# Patient Record
Sex: MALE | Race: WHITE | Employment: FULL TIME | URBAN - METROPOLITAN AREA
[De-identification: names, ages, dates, MRNs, and addresses within clinical notes are randomized per-mention and may not be internally consistent; named-entity substitution may affect disease eponyms.]

---

## 2018-02-01 ENCOUNTER — TELEPHONE (OUTPATIENT)
Dept: FAMILY MEDICINE CLINIC | Facility: CLINIC | Age: 58
End: 2018-02-01

## 2018-02-01 DIAGNOSIS — Z00.00 LABORATORY EXAMINATION ORDERED AS PART OF A ROUTINE GENERAL MEDICAL EXAMINATION: Primary | ICD-10-CM

## 2018-02-20 ENCOUNTER — LAB ENCOUNTER (OUTPATIENT)
Dept: LAB | Age: 58
End: 2018-02-20
Attending: FAMILY MEDICINE
Payer: COMMERCIAL

## 2018-02-20 DIAGNOSIS — Z00.00 LABORATORY EXAMINATION ORDERED AS PART OF A ROUTINE GENERAL MEDICAL EXAMINATION: ICD-10-CM

## 2018-02-20 LAB
ALBUMIN SERPL-MCNC: 3.9 G/DL (ref 3.5–4.8)
ALP LIVER SERPL-CCNC: 94 U/L (ref 45–117)
ALT SERPL-CCNC: 29 U/L (ref 17–63)
AST SERPL-CCNC: 20 U/L (ref 15–41)
BASOPHILS # BLD AUTO: 0.02 X10(3) UL (ref 0–0.1)
BASOPHILS NFR BLD AUTO: 0.5 %
BILIRUB SERPL-MCNC: 2 MG/DL (ref 0.1–2)
BUN BLD-MCNC: 13 MG/DL (ref 8–20)
CALCIUM BLD-MCNC: 9 MG/DL (ref 8.3–10.3)
CHLORIDE: 104 MMOL/L (ref 101–111)
CHOLEST SMN-MCNC: 186 MG/DL (ref ?–200)
CO2: 27 MMOL/L (ref 22–32)
COMPLEXED PSA SERPL-MCNC: 0.37 NG/ML (ref 0.01–4)
CREAT BLD-MCNC: 0.91 MG/DL (ref 0.7–1.3)
EOSINOPHIL # BLD AUTO: 0.05 X10(3) UL (ref 0–0.3)
EOSINOPHIL NFR BLD AUTO: 1.3 %
ERYTHROCYTE [DISTWIDTH] IN BLOOD BY AUTOMATED COUNT: 13.3 % (ref 11.5–16)
GLUCOSE BLD-MCNC: 72 MG/DL (ref 70–99)
HCT VFR BLD AUTO: 41.2 % (ref 37–53)
HDLC SERPL-MCNC: 47 MG/DL (ref 45–?)
HDLC SERPL: 3.96 {RATIO} (ref ?–4.97)
HEPATITIS C VIRUS AB INTERPRETATION: NONREACTIVE
HGB BLD-MCNC: 13.8 G/DL (ref 13–17)
IMMATURE GRANULOCYTE COUNT: 0.01 X10(3) UL (ref 0–1)
IMMATURE GRANULOCYTE RATIO %: 0.3 %
LDLC SERPL CALC-MCNC: 123 MG/DL (ref ?–130)
LYMPHOCYTES # BLD AUTO: 1.6 X10(3) UL (ref 0.9–4)
LYMPHOCYTES NFR BLD AUTO: 40.3 %
M PROTEIN MFR SERPL ELPH: 6.8 G/DL (ref 6.1–8.3)
MCH RBC QN AUTO: 29.5 PG (ref 27–33.2)
MCHC RBC AUTO-ENTMCNC: 33.5 G/DL (ref 31–37)
MCV RBC AUTO: 88 FL (ref 80–99)
MONOCYTES # BLD AUTO: 0.45 X10(3) UL (ref 0.1–1)
MONOCYTES NFR BLD AUTO: 11.3 %
NEUTROPHIL ABS PRELIM: 1.84 X10 (3) UL (ref 1.3–6.7)
NEUTROPHILS # BLD AUTO: 1.84 X10(3) UL (ref 1.3–6.7)
NEUTROPHILS NFR BLD AUTO: 46.3 %
NONHDLC SERPL-MCNC: 139 MG/DL (ref ?–130)
PLATELET # BLD AUTO: 256 10(3)UL (ref 150–450)
POTASSIUM SERPL-SCNC: 3.6 MMOL/L (ref 3.6–5.1)
RBC # BLD AUTO: 4.68 X10(6)UL (ref 4.3–5.7)
RED CELL DISTRIBUTION WIDTH-SD: 42.8 FL (ref 35.1–46.3)
SODIUM SERPL-SCNC: 137 MMOL/L (ref 136–144)
TRIGL SERPL-MCNC: 82 MG/DL (ref ?–150)
TSI SER-ACNC: 2.45 MIU/ML (ref 0.35–5.5)
VLDLC SERPL CALC-MCNC: 16 MG/DL (ref 5–40)
WBC # BLD AUTO: 4 X10(3) UL (ref 4–13)

## 2018-02-20 PROCEDURE — 84443 ASSAY THYROID STIM HORMONE: CPT

## 2018-02-20 PROCEDURE — 80061 LIPID PANEL: CPT

## 2018-02-20 PROCEDURE — 85025 COMPLETE CBC W/AUTO DIFF WBC: CPT

## 2018-02-20 PROCEDURE — 86803 HEPATITIS C AB TEST: CPT

## 2018-02-20 PROCEDURE — 80053 COMPREHEN METABOLIC PANEL: CPT

## 2018-03-01 ENCOUNTER — TELEPHONE (OUTPATIENT)
Dept: FAMILY MEDICINE CLINIC | Facility: CLINIC | Age: 58
End: 2018-03-01

## 2018-03-01 ENCOUNTER — OFFICE VISIT (OUTPATIENT)
Dept: FAMILY MEDICINE CLINIC | Facility: CLINIC | Age: 58
End: 2018-03-01

## 2018-03-01 VITALS
SYSTOLIC BLOOD PRESSURE: 120 MMHG | TEMPERATURE: 97 F | WEIGHT: 158 LBS | DIASTOLIC BLOOD PRESSURE: 80 MMHG | BODY MASS INDEX: 24.51 KG/M2 | HEART RATE: 72 BPM | RESPIRATION RATE: 14 BRPM | HEIGHT: 67.25 IN

## 2018-03-01 DIAGNOSIS — Z00.00 ANNUAL PHYSICAL EXAM: Primary | ICD-10-CM

## 2018-03-01 PROCEDURE — 99396 PREV VISIT EST AGE 40-64: CPT | Performed by: FAMILY MEDICINE

## 2018-03-01 NOTE — PROGRESS NOTES
Bernice Lopez is a 56year old male who presents for a complete physical exam.   HPI:   Patient presents with:  Physical      His last annual assessment has been over 1 year: Annual Physical due on 12/18/2016       Pt complains of doing well, much b 02/20/2018 08:49 AM       No results found for: A1C, VITD, PSA           Current Outpatient Prescriptions on File Prior to Visit:  Cholecalciferol (VITAMIN D3) 25860 UNITS Oral Cap Take 1 tablet by mouth every 7 days.      No current facility-administered m Nursing note and vitals reviewed. Constitutional: He is oriented to person, place, and time. He appears well-developed and well-nourished. No distress. HENT:   Head: Normocephalic and atraumatic.    Right Ear: Hearing, tympanic membrane, external ear exercise 30 minutes three times weekly.    Health maintenance, UTD   Immunizations: UTD    Immunization History   Administered Date(s) Administered   • >=3 YRS TRI  MULTIDOSE VIAL (99460) FLU CLINIC 10/23/2014   • Influenza 10/28/2013, 09/11/2015, 09/21/201

## 2019-02-26 ENCOUNTER — TELEPHONE (OUTPATIENT)
Dept: FAMILY MEDICINE CLINIC | Facility: CLINIC | Age: 59
End: 2019-02-26

## 2019-02-26 DIAGNOSIS — Z00.00 LABORATORY EXAMINATION ORDERED AS PART OF A ROUTINE GENERAL MEDICAL EXAMINATION: Primary | ICD-10-CM

## 2019-02-26 NOTE — TELEPHONE ENCOUNTER
Diagnoses and all orders for this visit:    Laboratory examination ordered as part of a routine general medical examination  -     COMP METABOLIC PANEL (14); Future  -     LIPID PANEL;  Future  -     TSH W REFLEX TO FREE T4; Future  -     CBC, PLATELET; NO

## 2019-02-26 NOTE — TELEPHONE ENCOUNTER
Please enter lab orders for the patient's upcoming physical appointment. Physical scheduled:    Your appointments     Date & Time Appointment Department [facility])    Mar 14, 2019  8:30 AM CDT Physical - Established Patient with MD Micah Nelson

## 2019-03-07 ENCOUNTER — APPOINTMENT (OUTPATIENT)
Dept: LAB | Age: 59
End: 2019-03-07
Attending: RADIOLOGY
Payer: COMMERCIAL

## 2019-03-07 DIAGNOSIS — Z00.00 LABORATORY EXAMINATION ORDERED AS PART OF A ROUTINE GENERAL MEDICAL EXAMINATION: ICD-10-CM

## 2019-03-07 LAB
ALBUMIN SERPL-MCNC: 4 G/DL (ref 3.4–5)
ALBUMIN/GLOB SERPL: 1.3 {RATIO} (ref 1–2)
ALP LIVER SERPL-CCNC: 92 U/L (ref 45–117)
ALT SERPL-CCNC: 29 U/L (ref 16–61)
ANION GAP SERPL CALC-SCNC: 9 MMOL/L (ref 0–18)
AST SERPL-CCNC: 21 U/L (ref 15–37)
BILIRUB SERPL-MCNC: 2.4 MG/DL (ref 0.1–2)
BUN BLD-MCNC: 13 MG/DL (ref 7–18)
BUN/CREAT SERPL: 13.8 (ref 10–20)
CALCIUM BLD-MCNC: 9.3 MG/DL (ref 8.5–10.1)
CHLORIDE SERPL-SCNC: 106 MMOL/L (ref 98–107)
CHOLEST SMN-MCNC: 180 MG/DL (ref ?–200)
CO2 SERPL-SCNC: 26 MMOL/L (ref 21–32)
CREAT BLD-MCNC: 0.94 MG/DL (ref 0.7–1.3)
DEPRECATED RDW RBC AUTO: 40.8 FL (ref 35.1–46.3)
ERYTHROCYTE [DISTWIDTH] IN BLOOD BY AUTOMATED COUNT: 12.9 % (ref 11–15)
GLOBULIN PLAS-MCNC: 3 G/DL (ref 2.8–4.4)
GLUCOSE BLD-MCNC: 76 MG/DL (ref 70–99)
HCT VFR BLD AUTO: 42 % (ref 39–53)
HDLC SERPL-MCNC: 45 MG/DL (ref 40–59)
HGB BLD-MCNC: 14.4 G/DL (ref 13–17.5)
LDLC SERPL CALC-MCNC: 118 MG/DL (ref ?–100)
M PROTEIN MFR SERPL ELPH: 7 G/DL (ref 6.4–8.2)
MCH RBC QN AUTO: 30 PG (ref 26–34)
MCHC RBC AUTO-ENTMCNC: 34.3 G/DL (ref 31–37)
MCV RBC AUTO: 87.5 FL (ref 80–100)
NONHDLC SERPL-MCNC: 135 MG/DL (ref ?–130)
OSMOLALITY SERPL CALC.SUM OF ELEC: 291 MOSM/KG (ref 275–295)
PLATELET # BLD AUTO: 240 10(3)UL (ref 150–450)
POTASSIUM SERPL-SCNC: 4.2 MMOL/L (ref 3.5–5.1)
RBC # BLD AUTO: 4.8 X10(6)UL (ref 4.3–5.7)
SODIUM SERPL-SCNC: 141 MMOL/L (ref 136–145)
TRIGL SERPL-MCNC: 87 MG/DL (ref 30–149)
TSI SER-ACNC: 1.77 MIU/ML (ref 0.36–3.74)
VLDLC SERPL CALC-MCNC: 17 MG/DL (ref 0–30)
WBC # BLD AUTO: 4.5 X10(3) UL (ref 4–11)

## 2019-03-07 PROCEDURE — 80061 LIPID PANEL: CPT

## 2019-03-07 PROCEDURE — 85027 COMPLETE CBC AUTOMATED: CPT

## 2019-03-07 PROCEDURE — 84443 ASSAY THYROID STIM HORMONE: CPT

## 2019-03-07 PROCEDURE — 80053 COMPREHEN METABOLIC PANEL: CPT

## 2019-03-14 ENCOUNTER — OFFICE VISIT (OUTPATIENT)
Dept: FAMILY MEDICINE CLINIC | Facility: CLINIC | Age: 59
End: 2019-03-14
Payer: COMMERCIAL

## 2019-03-14 VITALS
TEMPERATURE: 98 F | HEIGHT: 67.5 IN | SYSTOLIC BLOOD PRESSURE: 138 MMHG | RESPIRATION RATE: 12 BRPM | BODY MASS INDEX: 24.35 KG/M2 | DIASTOLIC BLOOD PRESSURE: 88 MMHG | HEART RATE: 68 BPM | WEIGHT: 157 LBS

## 2019-03-14 DIAGNOSIS — Z00.00 ANNUAL PHYSICAL EXAM: Primary | ICD-10-CM

## 2019-03-14 PROCEDURE — 99396 PREV VISIT EST AGE 40-64: CPT | Performed by: FAMILY MEDICINE

## 2019-03-14 NOTE — PROGRESS NOTES
Sunshine Lawson is a 66year old male who presents for a complete physical exam.   HPI:   Patient presents with:  Physical      His last annual assessment has been over 1 year: Annual Physical due on 03/01/2019       Pt complains of doing well, higher results found for: A1C, VITD, PSA           Current Outpatient Medications on File Prior to Visit:  Cholecalciferol (VITAMIN D3) 44993 UNITS Oral Cap Take 1 tablet by mouth every 7 days.      No current facility-administered medications on file prior to vis oriented to person, place, and time. Vital signs are normal. He appears well-developed and well-nourished. HENT:   Head: Normocephalic and atraumatic.    Right Ear: Tympanic membrane, external ear and ear canal normal.   Left Ear: Tympanic membrane, exter complete physical exam. Pt's weight is Body mass index is 24.23 kg/m². , recommended low fat diet and aerobic exercise 30 minutes three times weekly.    Health maintenance, utd   Immunizations: utd    Immunization History   Administered Date(s) Administered

## 2019-09-11 PROBLEM — R03.0 ELEVATED BLOOD-PRESSURE READING WITHOUT DIAGNOSIS OF HYPERTENSION: Status: ACTIVE | Noted: 2019-09-11

## 2019-09-12 ENCOUNTER — OFFICE VISIT (OUTPATIENT)
Dept: FAMILY MEDICINE CLINIC | Facility: CLINIC | Age: 59
End: 2019-09-12
Payer: COMMERCIAL

## 2019-09-12 VITALS
HEART RATE: 68 BPM | BODY MASS INDEX: 25.44 KG/M2 | SYSTOLIC BLOOD PRESSURE: 138 MMHG | WEIGHT: 164 LBS | RESPIRATION RATE: 14 BRPM | TEMPERATURE: 97 F | HEIGHT: 67.25 IN | DIASTOLIC BLOOD PRESSURE: 76 MMHG

## 2019-09-12 DIAGNOSIS — I10 ESSENTIAL HYPERTENSION: Primary | ICD-10-CM

## 2019-09-12 DIAGNOSIS — Z00.00 LABORATORY EXAMINATION ORDERED AS PART OF A ROUTINE GENERAL MEDICAL EXAMINATION: ICD-10-CM

## 2019-09-12 PROCEDURE — 90686 IIV4 VACC NO PRSV 0.5 ML IM: CPT | Performed by: FAMILY MEDICINE

## 2019-09-12 PROCEDURE — 90471 IMMUNIZATION ADMIN: CPT | Performed by: FAMILY MEDICINE

## 2019-09-12 PROCEDURE — 99213 OFFICE O/P EST LOW 20 MIN: CPT | Performed by: FAMILY MEDICINE

## 2019-09-12 RX ORDER — VALSARTAN 160 MG/1
160 TABLET ORAL DAILY
Qty: 90 TABLET | Refills: 3 | Status: SHIPPED | OUTPATIENT
Start: 2019-09-12 | End: 2020-07-30

## 2019-09-12 NOTE — PROGRESS NOTES
Patient presents with:  Blood Pressure: 6 month Follow Up   Cough: x 4-5 weeks, went to [facility]   Imm/Inj: Flu Shot      Subjective   HPI:   This is a 66year old male coming in for elevated BP, better but still norderline elevated.  BP reading 130-16 Psychiatric: He has a normal mood and affect.  His speech is normal and behavior is normal. Judgment and thought content normal. Cognition and memory are normal.            Assessment and Plan:     Problem List Items Addressed This Visit        Lorri Sandoval

## 2019-09-21 ENCOUNTER — APPOINTMENT (OUTPATIENT)
Dept: GENERAL RADIOLOGY | Age: 59
End: 2019-09-21
Attending: EMERGENCY MEDICINE
Payer: COMMERCIAL

## 2019-09-21 ENCOUNTER — HOSPITAL ENCOUNTER (OUTPATIENT)
Age: 59
Discharge: HOME | End: 2019-09-21
Attending: EMERGENCY MEDICINE
Payer: COMMERCIAL

## 2019-09-21 VITALS
OXYGEN SATURATION: 98 % | SYSTOLIC BLOOD PRESSURE: 132 MMHG | HEIGHT: 67 IN | HEART RATE: 68 BPM | DIASTOLIC BLOOD PRESSURE: 78 MMHG | TEMPERATURE: 98 F | RESPIRATION RATE: 16 BRPM | BODY MASS INDEX: 25.11 KG/M2 | WEIGHT: 160 LBS

## 2019-09-21 DIAGNOSIS — M25.562 ACUTE PAIN OF LEFT KNEE: Primary | ICD-10-CM

## 2019-09-21 PROCEDURE — 99203 OFFICE O/P NEW LOW 30 MIN: CPT

## 2019-09-21 PROCEDURE — 73560 X-RAY EXAM OF KNEE 1 OR 2: CPT | Performed by: EMERGENCY MEDICINE

## 2019-09-21 PROCEDURE — 99213 OFFICE O/P EST LOW 20 MIN: CPT

## 2019-09-21 NOTE — ED INITIAL ASSESSMENT (HPI)
Had some achy pain to left knee for a week, no injury. Yesterday thought would exercise and lifted weights and did some squats which worsened the knee pain. Knee pain to medial aspect of knee, slight swelling.  Patient states hurts worse to straighten the l

## 2019-09-21 NOTE — ED PROVIDER NOTES
Patient presents with:  Knee Pain      HPI:     Matty Saul is a 66year old male who presents today complaining of left knee pain. No injury.   States that it has been feeling sore for the past week but recently did some exercises with weights th Taking ibuprofen, last dose               800mg this morning at 9am.                        Order Specific Question: What is the Relevant Clinical Indication / Reason for Exam?          Answer: L knee pain        Labs performed this visit:  No results foun

## 2019-12-01 ENCOUNTER — HOSPITAL ENCOUNTER (OUTPATIENT)
Age: 59
Discharge: HOME | End: 2019-12-01
Payer: COMMERCIAL

## 2019-12-01 ENCOUNTER — APPOINTMENT (OUTPATIENT)
Dept: GENERAL RADIOLOGY | Age: 59
End: 2019-12-01
Attending: PHYSICIAN ASSISTANT
Payer: COMMERCIAL

## 2019-12-01 VITALS
SYSTOLIC BLOOD PRESSURE: 150 MMHG | DIASTOLIC BLOOD PRESSURE: 90 MMHG | HEART RATE: 69 BPM | OXYGEN SATURATION: 98 % | TEMPERATURE: 98 F | RESPIRATION RATE: 16 BRPM

## 2019-12-01 DIAGNOSIS — M54.16 ACUTE LEFT LUMBAR RADICULOPATHY: Primary | ICD-10-CM

## 2019-12-01 PROCEDURE — 99214 OFFICE O/P EST MOD 30 MIN: CPT

## 2019-12-01 PROCEDURE — 99213 OFFICE O/P EST LOW 20 MIN: CPT

## 2019-12-01 PROCEDURE — 72100 X-RAY EXAM L-S SPINE 2/3 VWS: CPT | Performed by: PHYSICIAN ASSISTANT

## 2019-12-01 RX ORDER — METHYLPREDNISOLONE 4 MG/1
TABLET ORAL
Qty: 1 PACKAGE | Refills: 0 | Status: SHIPPED | OUTPATIENT
Start: 2019-12-01 | End: 2020-02-10

## 2019-12-01 RX ORDER — DEXAMETHASONE SODIUM PHOSPHATE 4 MG/ML
10 INJECTION, SOLUTION INTRA-ARTICULAR; INTRALESIONAL; INTRAMUSCULAR; INTRAVENOUS; SOFT TISSUE ONCE
Status: COMPLETED | OUTPATIENT
Start: 2019-12-01 | End: 2019-12-01

## 2019-12-01 NOTE — ED INITIAL ASSESSMENT (HPI)
Left leg- pt states he is a runner. He states ran for 3 miles on Thursday after running he started  To feel pain on different parts of the leg  4-6  Hrs after. Denies any injury or swelling .  Has been taking pain meds but no relief

## 2019-12-01 NOTE — ED PROVIDER NOTES
Patient Seen in: [facility]      History   Patient presents with:  Leg Pain    Stated Complaint: l leg pain    HPI  16-year-old male who comes in today complaining of left lower extremity pain that starts at the buttock and ivanna Drinks per session: 1 or 2      Comment: Occasional    Drug use: No             Review of Systems    Positive for stated complaint: l leg pain  Other systems are as noted in HPI. Constitutional and vital signs reviewed.       All other systems reviewed and subluxation or other acute process. Minimal scoliosis concave to the right on the frontal view upper-mid lumbar. No destructive process or significant disc space narrowing.    Dictated by: Mack Christy MD on 12/01/2019 at 15:59     Approved by: Brian Hong

## 2020-02-10 ENCOUNTER — HOSPITAL ENCOUNTER (OUTPATIENT)
Age: 60
Discharge: HOME | End: 2020-02-10
Attending: EMERGENCY MEDICINE
Payer: COMMERCIAL

## 2020-02-10 VITALS
OXYGEN SATURATION: 98 % | BODY MASS INDEX: 25 KG/M2 | HEART RATE: 97 BPM | SYSTOLIC BLOOD PRESSURE: 140 MMHG | TEMPERATURE: 98 F | DIASTOLIC BLOOD PRESSURE: 84 MMHG | WEIGHT: 159 LBS | RESPIRATION RATE: 16 BRPM

## 2020-02-10 DIAGNOSIS — H10.32 ACUTE CONJUNCTIVITIS OF LEFT EYE, UNSPECIFIED ACUTE CONJUNCTIVITIS TYPE: Primary | ICD-10-CM

## 2020-02-10 DIAGNOSIS — J06.9 VIRAL UPPER RESPIRATORY TRACT INFECTION: ICD-10-CM

## 2020-02-10 PROCEDURE — 99213 OFFICE O/P EST LOW 20 MIN: CPT

## 2020-02-10 PROCEDURE — 99214 OFFICE O/P EST MOD 30 MIN: CPT

## 2020-02-10 RX ORDER — IBUPROFEN 600 MG/1
600 TABLET ORAL EVERY 6 HOURS PRN
COMMUNITY
End: 2020-08-31

## 2020-02-10 RX ORDER — TOBRAMYCIN 3 MG/ML
2 SOLUTION/ DROPS OPHTHALMIC EVERY 6 HOURS
Qty: 1 BOTTLE | Refills: 0 | Status: SHIPPED | OUTPATIENT
Start: 2020-02-10 | End: 2020-02-17

## 2020-02-10 RX ORDER — TETRACAINE HYDROCHLORIDE 5 MG/ML
2 SOLUTION OPHTHALMIC ONCE
Status: COMPLETED | OUTPATIENT
Start: 2020-02-10 | End: 2020-02-10

## 2020-02-10 NOTE — ED INITIAL ASSESSMENT (HPI)
Wed Pt c/o low grade fever, ST Sunday Pt states fever was gone    Sunday Pt woke with left eye discharge, +itch/burn, sinus pain/pressure, productive cough, +redness.     denies fevers, Pt does not wear contacts in his left eye, denies vision issues

## 2020-02-10 NOTE — ED PROVIDER NOTES
Patient Seen in: [address] [facility]      History   Patient presents with:  Cough/URI    Stated Complaint: red eye sinus pain and pressure and fever    HPI    26-year-old white male complaining of left eye redness.   Patient states he had some Device None (Room air)       Current:/84   Pulse 97   Temp 97.9 °F (36.6 °C) (Temporal)   Resp 16   Wt 72.1 kg   SpO2 98%   BMI 24.90 kg/m²         Physical Exam    Patient is alert and orient x3 no acute distress HEENT exam the oropharynx clear tymp

## 2020-07-28 DIAGNOSIS — I10 ESSENTIAL HYPERTENSION: ICD-10-CM

## 2020-07-29 NOTE — TELEPHONE ENCOUNTER
Requested Prescriptions     Pending Prescriptions Disp Refills   • VALSARTAN 160 MG Oral Tab [Pharmacy Med Name: VALSARTAN 160 MG TABLET] 90 tablet 3     Sig: TAKE 1 TABLET BY MOUTH EVERY DAY     LOV 9/12/2019   Labs completed 3/7/19  Creatinine 0.94    Pa

## 2020-07-30 RX ORDER — VALSARTAN 160 MG/1
TABLET ORAL
Qty: 30 TABLET | Refills: 0 | Status: SHIPPED | OUTPATIENT
Start: 2020-07-30 | End: 2020-08-25

## 2020-07-30 NOTE — TELEPHONE ENCOUNTER
Patient called back and scheduled for 08/31/20 with Dr Meg Perkins, he currently has 2wks of medication left, asked if can get a temporary refill to last until appt time?

## 2020-07-30 NOTE — TELEPHONE ENCOUNTER
Refilled x 1 month to last upcomming appt.     Future Appointments   Date Time Provider Debbie Tao   8/31/2020  9:30 AM Amanda Sloan MD [facility] 3 [facility]   1/27/2021  9:00 AM Tim Pearce MD [facility]

## 2020-08-22 DIAGNOSIS — I10 ESSENTIAL HYPERTENSION: ICD-10-CM

## 2020-08-25 RX ORDER — VALSARTAN 160 MG/1
TABLET ORAL
Qty: 30 TABLET | Refills: 0 | Status: SHIPPED | OUTPATIENT
Start: 2020-08-25 | End: 2020-09-23

## 2020-08-25 NOTE — TELEPHONE ENCOUNTER
Requested Prescriptions     Pending Prescriptions Disp Refills   • VALSARTAN 160 MG Oral Tab [Pharmacy Med Name: VALSARTAN 160 MG TABLET] 30 tablet 0     Sig: TAKE 1 TABLET BY MOUTH EVERY DAY     LOV 9/12/2019     Patient was asked to follow-up in: 6 month

## 2020-08-29 NOTE — ASSESSMENT & PLAN NOTE
Borderline levels, add amlodipine, repeat in 3 months  Blood Pressure and Cardiac Medications          amLODIPine Besylate 5 MG Oral Tab    VALSARTAN 160 MG Oral Tab

## 2020-08-31 ENCOUNTER — OFFICE VISIT (OUTPATIENT)
Dept: FAMILY MEDICINE CLINIC | Facility: CLINIC | Age: 60
End: 2020-08-31
Payer: COMMERCIAL

## 2020-08-31 ENCOUNTER — PATIENT MESSAGE (OUTPATIENT)
Dept: FAMILY MEDICINE CLINIC | Facility: CLINIC | Age: 60
End: 2020-08-31

## 2020-08-31 VITALS
DIASTOLIC BLOOD PRESSURE: 88 MMHG | RESPIRATION RATE: 16 BRPM | WEIGHT: 166 LBS | BODY MASS INDEX: 25.75 KG/M2 | HEIGHT: 67.5 IN | HEART RATE: 68 BPM | TEMPERATURE: 97 F | SYSTOLIC BLOOD PRESSURE: 138 MMHG

## 2020-08-31 DIAGNOSIS — I10 ESSENTIAL HYPERTENSION: Primary | ICD-10-CM

## 2020-08-31 PROCEDURE — 99213 OFFICE O/P EST LOW 20 MIN: CPT | Performed by: FAMILY MEDICINE

## 2020-08-31 PROCEDURE — 3075F SYST BP GE 130 - 139MM HG: CPT | Performed by: FAMILY MEDICINE

## 2020-08-31 PROCEDURE — 3008F BODY MASS INDEX DOCD: CPT | Performed by: FAMILY MEDICINE

## 2020-08-31 PROCEDURE — 3079F DIAST BP 80-89 MM HG: CPT | Performed by: FAMILY MEDICINE

## 2020-08-31 RX ORDER — AMLODIPINE BESYLATE 5 MG/1
5 TABLET ORAL DAILY
Qty: 90 TABLET | Refills: 3 | Status: SHIPPED | OUTPATIENT
Start: 2020-08-31 | End: 2021-08-11

## 2020-08-31 NOTE — PROGRESS NOTES
HPI:   Patient presents with:  Blood Pressure    Subjective   Drusilla Simmonds is a 64year old male who presents for recheck of his hypertension. He has been taking medications as instructed, with no medication side effects.  His home BP monitoring in Head: Normocephalic. Neck: Normal range of motion. Cardiovascular: Normal rate, regular rhythm, S1 normal, S2 normal and normal heart sounds. No murmur heard. Pulses:       Posterior tibial pulses are 2+ on the right side and 2+ on the left side.

## 2020-09-01 NOTE — TELEPHONE ENCOUNTER
From: Wai Quesada  To: Vira Donahue MD  Sent: 8/31/2020 10:20 PM CDT  Subject: Other    I received a flu shot today, Fluzone Quad [id number] Syringe at my [facility]. Could you please update my records? Thanks!

## 2020-09-22 DIAGNOSIS — I10 ESSENTIAL HYPERTENSION: ICD-10-CM

## 2020-09-23 RX ORDER — VALSARTAN 160 MG/1
TABLET ORAL
Qty: 30 TABLET | Refills: 11 | Status: SHIPPED | OUTPATIENT
Start: 2020-09-23 | End: 2020-10-12

## 2020-09-23 NOTE — TELEPHONE ENCOUNTER
Requested Prescriptions     Pending Prescriptions Disp Refills   • VALSARTAN 160 MG Oral Tab [Pharmacy Med Name: VALSARTAN 160 MG TABLET] 30 tablet 0     Sig: TAKE 1 TABLET BY MOUTH EVERY DAY     LOV 8/31/2020 (HTN)  Labs completed 3/7/19    Patient was as

## 2020-10-10 NOTE — ASSESSMENT & PLAN NOTE
Stable, Continue present management.     Blood Pressure and Cardiac Medications          VALSARTAN 160 MG Oral Tab    amLODIPine Besylate 5 MG Oral Tab

## 2020-10-12 ENCOUNTER — OFFICE VISIT (OUTPATIENT)
Dept: FAMILY MEDICINE CLINIC | Facility: CLINIC | Age: 60
End: 2020-10-12
Payer: COMMERCIAL

## 2020-10-12 VITALS
TEMPERATURE: 98 F | RESPIRATION RATE: 16 BRPM | SYSTOLIC BLOOD PRESSURE: 120 MMHG | WEIGHT: 158 LBS | HEIGHT: 67 IN | DIASTOLIC BLOOD PRESSURE: 70 MMHG | HEART RATE: 64 BPM | BODY MASS INDEX: 24.8 KG/M2

## 2020-10-12 DIAGNOSIS — I10 ESSENTIAL HYPERTENSION: Primary | ICD-10-CM

## 2020-10-12 PROCEDURE — 3008F BODY MASS INDEX DOCD: CPT | Performed by: FAMILY MEDICINE

## 2020-10-12 PROCEDURE — 99213 OFFICE O/P EST LOW 20 MIN: CPT | Performed by: FAMILY MEDICINE

## 2020-10-12 PROCEDURE — 3078F DIAST BP <80 MM HG: CPT | Performed by: FAMILY MEDICINE

## 2020-10-12 PROCEDURE — 3074F SYST BP LT 130 MM HG: CPT | Performed by: FAMILY MEDICINE

## 2020-10-12 RX ORDER — VALSARTAN 160 MG/1
160 TABLET ORAL DAILY
Qty: 90 TABLET | Refills: 3 | Status: SHIPPED | OUTPATIENT
Start: 2020-10-12 | End: 2021-10-08

## 2020-10-12 NOTE — PROGRESS NOTES
HPI:   Patient presents with:  Blood Pressure    Subjective   Moe Ware is a 64year old male who presents for recheck of his hypertension. He has been taking medications as instructed, with no medication side effects.  His home BP monitoring in distress. HENT:   Head: Normocephalic. Neck: Normal range of motion. Cardiovascular: Normal rate, regular rhythm, S1 normal, S2 normal and normal heart sounds. No murmur heard.   Pulses:       Posterior tibial pulses are 2+ on the right side and 2+

## 2021-04-08 ENCOUNTER — OFFICE VISIT (OUTPATIENT)
Dept: FAMILY MEDICINE CLINIC | Facility: CLINIC | Age: 61
End: 2021-04-08
Payer: COMMERCIAL

## 2021-04-08 VITALS
DIASTOLIC BLOOD PRESSURE: 74 MMHG | SYSTOLIC BLOOD PRESSURE: 132 MMHG | WEIGHT: 159 LBS | HEIGHT: 67 IN | RESPIRATION RATE: 16 BRPM | OXYGEN SATURATION: 97 % | HEART RATE: 72 BPM | BODY MASS INDEX: 24.96 KG/M2 | TEMPERATURE: 98 F

## 2021-04-08 DIAGNOSIS — I10 ESSENTIAL HYPERTENSION: Primary | ICD-10-CM

## 2021-04-08 DIAGNOSIS — K21.9 GASTROESOPHAGEAL REFLUX DISEASE WITHOUT ESOPHAGITIS: ICD-10-CM

## 2021-04-08 PROCEDURE — 3008F BODY MASS INDEX DOCD: CPT | Performed by: FAMILY MEDICINE

## 2021-04-08 PROCEDURE — 3075F SYST BP GE 130 - 139MM HG: CPT | Performed by: FAMILY MEDICINE

## 2021-04-08 PROCEDURE — 3078F DIAST BP <80 MM HG: CPT | Performed by: FAMILY MEDICINE

## 2021-04-08 PROCEDURE — 99213 OFFICE O/P EST LOW 20 MIN: CPT | Performed by: FAMILY MEDICINE

## 2021-04-08 NOTE — PROGRESS NOTES
HPI/Subjective:     had concerns including Follow - Up (6 Month ) and Heartburn (m1tbsnx). Subjective   [address] is a 64year old male who presents for recheck of his hypertension.  He has been taking medications as instructed, with no medicati are 2+ on the right side and 2+ on the left side. Heart sounds: Normal heart sounds. No murmur heard. Pulmonary:      Effort: Pulmonary effort is normal. No respiratory distress. Breath sounds: Normal breath sounds. No wheezing.    Abdominal:

## 2021-05-08 ENCOUNTER — PATIENT MESSAGE (OUTPATIENT)
Dept: FAMILY MEDICINE CLINIC | Facility: CLINIC | Age: 61
End: 2021-05-08

## 2021-05-10 NOTE — TELEPHONE ENCOUNTER
From: Frantz Mercedes [facility]  To: Ivan Valdes MD  Sent: 5/8/2021 3:01 PM CDT  Subject: Other    Hi -    Please find my attached COVID-19 Vaccination card, showing both my [employer] vaccinations on 4/16/21 and 5/7/21. Please enter these into my records. Thanks!

## 2021-08-11 ENCOUNTER — TELEPHONE (OUTPATIENT)
Dept: FAMILY MEDICINE CLINIC | Facility: CLINIC | Age: 61
End: 2021-08-11

## 2021-08-11 RX ORDER — AMLODIPINE BESYLATE 5 MG/1
TABLET ORAL
Qty: 30 TABLET | Refills: 0 | Status: SHIPPED | OUTPATIENT
Start: 2021-08-11 | End: 2021-09-14

## 2021-08-11 NOTE — TELEPHONE ENCOUNTER
Requested Prescriptions     Pending Prescriptions Disp Refills   • AMLODIPINE BESYLATE 5 MG Oral Tab [Pharmacy Med Name: AMLODIPINE BESYLATE 5 MG TAB] 90 tablet 3     Sig: TAKE 1 TABLET BY MOUTH EVERY DAY     LOV 4/8/2021     Patient was asked to follow-up

## 2021-09-10 DIAGNOSIS — I10 ESSENTIAL HYPERTENSION: Primary | ICD-10-CM

## 2021-09-14 RX ORDER — AMLODIPINE BESYLATE 5 MG/1
TABLET ORAL
Qty: 30 TABLET | Refills: 0 | Status: SHIPPED | OUTPATIENT
Start: 2021-09-14 | End: 2021-10-07

## 2021-09-14 NOTE — TELEPHONE ENCOUNTER
AMLODIPINE BESYLATE 5 MG TAB     Sig: TAKE 1 TABLET BY MOUTH EVERY DAY    Disp:  30 tablet    Refills:  0 (Pharmacy requested: Not specified)    Start: 9/10/2021    Class: Normal    Non-formulary    Last ordered: 1 month ago by Nguyễn Eubanks MD Last refill:

## 2021-10-05 DIAGNOSIS — I10 ESSENTIAL HYPERTENSION: ICD-10-CM

## 2021-10-07 ENCOUNTER — PATIENT MESSAGE (OUTPATIENT)
Dept: FAMILY MEDICINE CLINIC | Facility: CLINIC | Age: 61
End: 2021-10-07

## 2021-10-07 RX ORDER — AMLODIPINE BESYLATE 5 MG/1
TABLET ORAL
Qty: 30 TABLET | Refills: 0 | Status: SHIPPED | OUTPATIENT
Start: 2021-10-07 | End: 2021-10-08

## 2021-10-07 NOTE — TELEPHONE ENCOUNTER
From: Arlyn Johnson [facility]  To: Lexis Elizondo MD  Sent: 10/7/2021 2:23 PM CDT  Subject: Received flu vaccine    Hi -  I received my flu vaccine from [facility] at [address] [place] [address] [address], on September 28, 2021.  Could you please enter this into my

## 2021-10-07 NOTE — TELEPHONE ENCOUNTER
Requested Prescriptions     Pending Prescriptions Disp Refills   • AMLODIPINE 5 MG Oral Tab [Pharmacy Med Name: AMLODIPINE BESYLATE 5 MG TAB] 30 tablet 0     Sig: TAKE 1 TABLET BY MOUTH EVERY DAY     LOV 4/8/2021     Patient was asked to follow-up in: 6 mo

## 2021-10-07 NOTE — ASSESSMENT & PLAN NOTE
Stable, Continue present management.     Blood Pressure and Cardiac Medications          AMLODIPINE 5 MG Oral Tab    valsartan 160 MG Oral Tab

## 2021-10-08 ENCOUNTER — OFFICE VISIT (OUTPATIENT)
Dept: FAMILY MEDICINE CLINIC | Facility: CLINIC | Age: 61
End: 2021-10-08
Payer: COMMERCIAL

## 2021-10-08 VITALS
SYSTOLIC BLOOD PRESSURE: 134 MMHG | RESPIRATION RATE: 14 BRPM | HEIGHT: 67 IN | WEIGHT: 161.38 LBS | BODY MASS INDEX: 25.33 KG/M2 | DIASTOLIC BLOOD PRESSURE: 80 MMHG | HEART RATE: 80 BPM

## 2021-10-08 DIAGNOSIS — Z00.00 LABORATORY EXAMINATION ORDERED AS PART OF A ROUTINE GENERAL MEDICAL EXAMINATION: ICD-10-CM

## 2021-10-08 DIAGNOSIS — Z00.00 ANNUAL PHYSICAL EXAM: Primary | ICD-10-CM

## 2021-10-08 DIAGNOSIS — I10 ESSENTIAL HYPERTENSION: ICD-10-CM

## 2021-10-08 DIAGNOSIS — Z12.5 SCREENING FOR PROSTATE CANCER: ICD-10-CM

## 2021-10-08 PROCEDURE — 3079F DIAST BP 80-89 MM HG: CPT | Performed by: FAMILY MEDICINE

## 2021-10-08 PROCEDURE — 3008F BODY MASS INDEX DOCD: CPT | Performed by: FAMILY MEDICINE

## 2021-10-08 PROCEDURE — 3075F SYST BP GE 130 - 139MM HG: CPT | Performed by: FAMILY MEDICINE

## 2021-10-08 PROCEDURE — 99396 PREV VISIT EST AGE 40-64: CPT | Performed by: FAMILY MEDICINE

## 2021-10-08 RX ORDER — AMLODIPINE BESYLATE 5 MG/1
5 TABLET ORAL DAILY
Qty: 90 TABLET | Refills: 3 | Status: SHIPPED | OUTPATIENT
Start: 2021-10-08

## 2021-10-08 RX ORDER — VALSARTAN 160 MG/1
160 TABLET ORAL DAILY
Qty: 90 TABLET | Refills: 3 | Status: SHIPPED | OUTPATIENT
Start: 2021-10-08

## 2021-10-08 NOTE — PROGRESS NOTES
Yasmin Lam is a 64year old male who presents for a complete physical exam.     had concerns including Physical (annual ).    His last annual assessment has been over 1 year: Annual Physical due on 03/14/2020       Subjective:    He complains of d Negative. Respiratory: Negative for chest tightness and shortness of breath. Cardiovascular: Negative. Gastrointestinal: Negative for abdominal pain. Genitourinary: Negative. Negative for flank pain. Musculoskeletal: Negative.   Negative for a atraumatic.       Right Ear: Tympanic membrane and external ear normal.      Left Ear: Tympanic membrane and external ear normal.      Nose: Nose normal.      Mouth/Throat:      Mouth: Mucous membranes are moist.   Eyes:      Extraocular Movements: Extraocu Administered Date(s) Administered   • >=3 YRS TRI  MULTIDOSE VIAL (74397) FLU CLINIC 10/23/2014   • Covid-19 Vaccine Pfizer 30 mcg/0.3 ml 04/16/2021, 05/07/2021   • FLULAVAL 6 months & older 0.5 ml Prefilled syringe (42837) 09/12/2019   • FLUZONE 6 month

## 2021-10-12 ENCOUNTER — LAB ENCOUNTER (OUTPATIENT)
Dept: LAB | Age: 61
End: 2021-10-12
Attending: FAMILY MEDICINE
Payer: COMMERCIAL

## 2021-10-12 DIAGNOSIS — Z12.5 SCREENING FOR PROSTATE CANCER: ICD-10-CM

## 2021-10-12 PROCEDURE — 80053 COMPREHEN METABOLIC PANEL: CPT | Performed by: FAMILY MEDICINE

## 2021-10-12 PROCEDURE — 85027 COMPLETE CBC AUTOMATED: CPT | Performed by: FAMILY MEDICINE

## 2021-10-12 PROCEDURE — 36415 COLL VENOUS BLD VENIPUNCTURE: CPT | Performed by: FAMILY MEDICINE

## 2021-10-12 PROCEDURE — 80061 LIPID PANEL: CPT | Performed by: FAMILY MEDICINE

## 2021-10-12 PROCEDURE — 84443 ASSAY THYROID STIM HORMONE: CPT | Performed by: FAMILY MEDICINE

## 2021-11-08 ENCOUNTER — PATIENT MESSAGE (OUTPATIENT)
Dept: FAMILY MEDICINE CLINIC | Facility: CLINIC | Age: 61
End: 2021-11-08

## 2021-11-08 NOTE — TELEPHONE ENCOUNTER
From: Amie Quesada  To: Christin Munroe MD  Sent: 11/8/2021 4:54 PM CST  Subject: Received Pfizer COVID-19 Booster    Hello -  I received my [employer] COVID-19 booster today. I've attached a scan of my vaccine card. Could you please update my records?  Maycol Castañeda

## 2021-11-09 ENCOUNTER — TELEPHONE (OUTPATIENT)
Dept: FAMILY MEDICINE CLINIC | Facility: CLINIC | Age: 61
End: 2021-11-09

## 2021-11-09 NOTE — TELEPHONE ENCOUNTER
Please enter lab orders for the patient's upcoming physical appointment. Physical scheduled:    Your appointments     Date & Time Appointment Department [facility])    Oct 14, 2022  9:00 AM CDT Adult Physical with Dhara Ho MD [address] [place] [address]

## 2022-07-11 ENCOUNTER — HOSPITAL ENCOUNTER (EMERGENCY)
Age: 62
Discharge: LEFT BEFORE DISCHARGE | End: 2022-07-11
Attending: PHYSICIAN ASSISTANT
Payer: COMMERCIAL

## 2022-07-11 ENCOUNTER — OFFICE VISIT (OUTPATIENT)
Dept: FAMILY MEDICINE CLINIC | Facility: CLINIC | Age: 62
End: 2022-07-11
Payer: COMMERCIAL

## 2022-07-11 ENCOUNTER — APPOINTMENT (OUTPATIENT)
Dept: GENERAL RADIOLOGY | Age: 62
End: 2022-07-11
Attending: PHYSICIAN ASSISTANT
Payer: COMMERCIAL

## 2022-07-11 VITALS
HEIGHT: 67 IN | OXYGEN SATURATION: 99 % | SYSTOLIC BLOOD PRESSURE: 138 MMHG | WEIGHT: 163.19 LBS | HEART RATE: 65 BPM | DIASTOLIC BLOOD PRESSURE: 78 MMHG | BODY MASS INDEX: 25.61 KG/M2 | TEMPERATURE: 98 F

## 2022-07-11 VITALS
WEIGHT: 157 LBS | OXYGEN SATURATION: 99 % | SYSTOLIC BLOOD PRESSURE: 141 MMHG | BODY MASS INDEX: 24.64 KG/M2 | TEMPERATURE: 98 F | HEIGHT: 67 IN | HEART RATE: 70 BPM | DIASTOLIC BLOOD PRESSURE: 87 MMHG | RESPIRATION RATE: 16 BRPM

## 2022-07-11 DIAGNOSIS — R05.9 COUGH: Primary | ICD-10-CM

## 2022-07-11 PROCEDURE — 71046 X-RAY EXAM CHEST 2 VIEWS: CPT | Performed by: PHYSICIAN ASSISTANT

## 2022-07-11 RX ORDER — PREDNISONE 20 MG/1
40 TABLET ORAL DAILY
Qty: 10 TABLET | Refills: 0 | Status: SHIPPED | OUTPATIENT
Start: 2022-07-11 | End: 2022-07-16

## 2022-07-11 NOTE — ED INITIAL ASSESSMENT (HPI)
Cough on 6-22, tested positive for covid on 6-23, cough is worse, no fever, sent from Urgent care for chest x-ray

## 2022-09-20 ENCOUNTER — PATIENT MESSAGE (OUTPATIENT)
Dept: FAMILY MEDICINE CLINIC | Facility: CLINIC | Age: 62
End: 2022-09-20

## 2022-09-20 NOTE — TELEPHONE ENCOUNTER
From: Cassidy To  To: Elizabeth Lin MD  Sent: 9/20/2022 10:59 AM CDT  Subject: Covid booster and flu shot    Hi -    Yesterday (9/19) I received a Covid booster (PFIZER COVID BIVAL(12Y UP)-EUA) and a flu shot (FLUCELVAX QUAD [id number] SYR) at my local [facility] pharmacy. Could you please update my records? Thanks!

## 2022-10-03 DIAGNOSIS — I10 ESSENTIAL HYPERTENSION: ICD-10-CM

## 2022-10-04 RX ORDER — AMLODIPINE BESYLATE 5 MG/1
TABLET ORAL
Qty: 90 TABLET | Refills: 1 | Status: SHIPPED | OUTPATIENT
Start: 2022-10-04

## 2022-10-07 ENCOUNTER — LAB ENCOUNTER (OUTPATIENT)
Dept: LAB | Age: 62
End: 2022-10-07
Attending: FAMILY MEDICINE
Payer: COMMERCIAL

## 2022-10-07 DIAGNOSIS — Z00.00 LABORATORY EXAMINATION ORDERED AS PART OF A ROUTINE GENERAL MEDICAL EXAMINATION: ICD-10-CM

## 2022-10-07 LAB
ALBUMIN SERPL-MCNC: 3.4 G/DL (ref 3.4–5)
ALBUMIN/GLOB SERPL: 1 {RATIO} (ref 1–2)
ALP LIVER SERPL-CCNC: 113 U/L
ALT SERPL-CCNC: 34 U/L
ANION GAP SERPL CALC-SCNC: 3 MMOL/L (ref 0–18)
AST SERPL-CCNC: 25 U/L (ref 15–37)
BASOPHILS # BLD AUTO: 0.02 X10(3) UL (ref 0–0.2)
BASOPHILS NFR BLD AUTO: 0.5 %
BILIRUB SERPL-MCNC: 0.9 MG/DL (ref 0.1–2)
BUN BLD-MCNC: 11 MG/DL (ref 7–18)
CALCIUM BLD-MCNC: 9.1 MG/DL (ref 8.5–10.1)
CHLORIDE SERPL-SCNC: 109 MMOL/L (ref 98–112)
CHOLEST SERPL-MCNC: 145 MG/DL (ref ?–200)
CO2 SERPL-SCNC: 26 MMOL/L (ref 21–32)
CREAT BLD-MCNC: 0.92 MG/DL
EOSINOPHIL # BLD AUTO: 0.03 X10(3) UL (ref 0–0.7)
EOSINOPHIL NFR BLD AUTO: 0.7 %
ERYTHROCYTE [DISTWIDTH] IN BLOOD BY AUTOMATED COUNT: 15.5 %
FASTING PATIENT LIPID ANSWER: YES
FASTING STATUS PATIENT QL REPORTED: YES
GFR SERPLBLD BASED ON 1.73 SQ M-ARVRAT: 96 ML/MIN/1.73M2 (ref 60–?)
GLOBULIN PLAS-MCNC: 3.3 G/DL (ref 2.8–4.4)
GLUCOSE BLD-MCNC: 83 MG/DL (ref 70–99)
HCT VFR BLD AUTO: 38 %
HDLC SERPL-MCNC: 33 MG/DL (ref 40–59)
HGB BLD-MCNC: 11.7 G/DL
IMM GRANULOCYTES # BLD AUTO: 0.01 X10(3) UL (ref 0–1)
IMM GRANULOCYTES NFR BLD: 0.2 %
LDLC SERPL CALC-MCNC: 88 MG/DL (ref ?–100)
LYMPHOCYTES # BLD AUTO: 1.95 X10(3) UL (ref 1–4)
LYMPHOCYTES NFR BLD AUTO: 45.6 %
MCH RBC QN AUTO: 26.2 PG (ref 26–34)
MCHC RBC AUTO-ENTMCNC: 30.8 G/DL (ref 31–37)
MCV RBC AUTO: 85 FL
MONOCYTES # BLD AUTO: 0.55 X10(3) UL (ref 0.1–1)
MONOCYTES NFR BLD AUTO: 12.9 %
NEUTROPHILS # BLD AUTO: 1.72 X10 (3) UL (ref 1.5–7.7)
NEUTROPHILS # BLD AUTO: 1.72 X10(3) UL (ref 1.5–7.7)
NEUTROPHILS NFR BLD AUTO: 40.1 %
NONHDLC SERPL-MCNC: 112 MG/DL (ref ?–130)
OSMOLALITY SERPL CALC.SUM OF ELEC: 285 MOSM/KG (ref 275–295)
PLATELET # BLD AUTO: 261 10(3)UL (ref 150–450)
POTASSIUM SERPL-SCNC: 4.6 MMOL/L (ref 3.5–5.1)
PROT SERPL-MCNC: 6.7 G/DL (ref 6.4–8.2)
RBC # BLD AUTO: 4.47 X10(6)UL
SODIUM SERPL-SCNC: 138 MMOL/L (ref 136–145)
TRIGL SERPL-MCNC: 137 MG/DL (ref 30–149)
TSI SER-ACNC: 1.67 MIU/ML (ref 0.36–3.74)
VLDLC SERPL CALC-MCNC: 22 MG/DL (ref 0–30)
WBC # BLD AUTO: 4.3 X10(3) UL (ref 4–11)

## 2022-10-07 PROCEDURE — 84443 ASSAY THYROID STIM HORMONE: CPT

## 2022-10-07 PROCEDURE — 36415 COLL VENOUS BLD VENIPUNCTURE: CPT

## 2022-10-07 PROCEDURE — 80053 COMPREHEN METABOLIC PANEL: CPT

## 2022-10-07 PROCEDURE — 80061 LIPID PANEL: CPT

## 2022-10-07 PROCEDURE — 85025 COMPLETE CBC W/AUTO DIFF WBC: CPT

## 2022-10-14 ENCOUNTER — OFFICE VISIT (OUTPATIENT)
Dept: FAMILY MEDICINE CLINIC | Facility: CLINIC | Age: 62
End: 2022-10-14
Payer: COMMERCIAL

## 2022-10-14 VITALS
WEIGHT: 161.81 LBS | BODY MASS INDEX: 25.4 KG/M2 | HEIGHT: 67 IN | DIASTOLIC BLOOD PRESSURE: 70 MMHG | SYSTOLIC BLOOD PRESSURE: 136 MMHG

## 2022-10-14 DIAGNOSIS — I10 ESSENTIAL HYPERTENSION: ICD-10-CM

## 2022-10-14 DIAGNOSIS — M25.521 RIGHT ELBOW PAIN: ICD-10-CM

## 2022-10-14 DIAGNOSIS — E78.2 MIXED HYPERLIPIDEMIA: ICD-10-CM

## 2022-10-14 DIAGNOSIS — D64.9 ANEMIA, UNSPECIFIED TYPE: ICD-10-CM

## 2022-10-14 DIAGNOSIS — Z00.00 ANNUAL PHYSICAL EXAM: Primary | ICD-10-CM

## 2022-10-14 PROCEDURE — 3075F SYST BP GE 130 - 139MM HG: CPT | Performed by: FAMILY MEDICINE

## 2022-10-14 PROCEDURE — 99396 PREV VISIT EST AGE 40-64: CPT | Performed by: FAMILY MEDICINE

## 2022-10-14 PROCEDURE — 3008F BODY MASS INDEX DOCD: CPT | Performed by: FAMILY MEDICINE

## 2022-10-14 PROCEDURE — 3078F DIAST BP <80 MM HG: CPT | Performed by: FAMILY MEDICINE

## 2022-10-14 RX ORDER — ATORVASTATIN CALCIUM 20 MG/1
20 TABLET, FILM COATED ORAL NIGHTLY
Qty: 90 TABLET | Refills: 3 | Status: SHIPPED | OUTPATIENT
Start: 2022-10-14

## 2022-10-14 NOTE — ASSESSMENT & PLAN NOTE
Star meds. Risks and benefits of medication discussed and alternatives discussed. Patient wishes to proceed with treatment.

## 2022-11-02 ENCOUNTER — LAB ENCOUNTER (OUTPATIENT)
Dept: LAB | Age: 62
End: 2022-11-02
Attending: FAMILY MEDICINE
Payer: COMMERCIAL

## 2022-11-02 LAB
BASOPHILS # BLD AUTO: 0.03 X10(3) UL (ref 0–0.2)
BASOPHILS NFR BLD AUTO: 0.6 %
DEPRECATED HBV CORE AB SER IA-ACNC: 4.9 NG/ML
EOSINOPHIL # BLD AUTO: 0.03 X10(3) UL (ref 0–0.7)
EOSINOPHIL NFR BLD AUTO: 0.6 %
ERYTHROCYTE [DISTWIDTH] IN BLOOD BY AUTOMATED COUNT: 14.6 %
HCT VFR BLD AUTO: 36.8 %
HGB BLD-MCNC: 11.9 G/DL
IMM GRANULOCYTES # BLD AUTO: 0.01 X10(3) UL (ref 0–1)
IMM GRANULOCYTES NFR BLD: 0.2 %
IRON SATN MFR SERPL: 12 %
IRON SERPL-MCNC: 53 UG/DL
LYMPHOCYTES # BLD AUTO: 1.79 X10(3) UL (ref 1–4)
LYMPHOCYTES NFR BLD AUTO: 34.9 %
MCH RBC QN AUTO: 27.2 PG (ref 26–34)
MCHC RBC AUTO-ENTMCNC: 32.3 G/DL (ref 31–37)
MCV RBC AUTO: 84.2 FL
MONOCYTES # BLD AUTO: 0.7 X10(3) UL (ref 0.1–1)
MONOCYTES NFR BLD AUTO: 13.6 %
NEUTROPHILS # BLD AUTO: 2.57 X10 (3) UL (ref 1.5–7.7)
NEUTROPHILS # BLD AUTO: 2.57 X10(3) UL (ref 1.5–7.7)
NEUTROPHILS NFR BLD AUTO: 50.1 %
PLATELET # BLD AUTO: 253 10(3)UL (ref 150–450)
RBC # BLD AUTO: 4.37 X10(6)UL
TIBC SERPL-MCNC: 441 UG/DL (ref 240–450)
TRANSFERRIN SERPL-MCNC: 296 MG/DL (ref 200–360)
WBC # BLD AUTO: 5.1 X10(3) UL (ref 4–11)

## 2022-11-02 PROCEDURE — 83540 ASSAY OF IRON: CPT | Performed by: FAMILY MEDICINE

## 2022-11-02 PROCEDURE — 85025 COMPLETE CBC W/AUTO DIFF WBC: CPT | Performed by: FAMILY MEDICINE

## 2022-11-02 PROCEDURE — 36415 COLL VENOUS BLD VENIPUNCTURE: CPT | Performed by: FAMILY MEDICINE

## 2022-11-02 PROCEDURE — 83550 IRON BINDING TEST: CPT | Performed by: FAMILY MEDICINE

## 2022-11-02 PROCEDURE — 82728 ASSAY OF FERRITIN: CPT | Performed by: FAMILY MEDICINE

## 2022-12-16 DIAGNOSIS — I10 ESSENTIAL HYPERTENSION: ICD-10-CM

## 2022-12-16 RX ORDER — VALSARTAN 160 MG/1
TABLET ORAL
Qty: 90 TABLET | Refills: 3 | Status: SHIPPED | OUTPATIENT
Start: 2022-12-16

## 2022-12-23 PROBLEM — D50.0 IRON DEFICIENCY ANEMIA DUE TO CHRONIC BLOOD LOSS: Status: ACTIVE | Noted: 2022-12-23

## 2022-12-26 ENCOUNTER — LAB ENCOUNTER (OUTPATIENT)
Dept: LAB | Facility: HOSPITAL | Age: 62
End: 2022-12-26
Attending: INTERNAL MEDICINE
Payer: COMMERCIAL

## 2022-12-26 DIAGNOSIS — Z20.822 ENCOUNTER FOR PREPROCEDURE SCREENING LABORATORY TESTING FOR COVID-19: ICD-10-CM

## 2022-12-26 DIAGNOSIS — Z01.812 ENCOUNTER FOR PREPROCEDURE SCREENING LABORATORY TESTING FOR COVID-19: ICD-10-CM

## 2022-12-26 LAB — SARS-COV-2 RNA RESP QL NAA+PROBE: NOT DETECTED

## 2022-12-28 PROBLEM — D50.9 IRON DEFICIENCY ANEMIA, UNSPECIFIED: Status: ACTIVE | Noted: 2022-12-23

## 2023-01-30 ENCOUNTER — TELEPHONE (OUTPATIENT)
Dept: FAMILY MEDICINE CLINIC | Facility: CLINIC | Age: 63
End: 2023-01-30

## 2023-01-30 NOTE — TELEPHONE ENCOUNTER
Pt is calling because last night he noticed some bright red blood on toilet paper after small stool and wants to know what he needs to do

## 2023-01-31 NOTE — ASSESSMENT & PLAN NOTE
Stable, Continue present management.     Blood Pressure and Cardiac Medications          VALSARTAN 160 MG Oral Tab    AMLODIPINE 5 MG Oral Tab

## 2023-02-01 ENCOUNTER — OFFICE VISIT (OUTPATIENT)
Dept: FAMILY MEDICINE CLINIC | Facility: CLINIC | Age: 63
End: 2023-02-01
Payer: COMMERCIAL

## 2023-02-01 VITALS
HEIGHT: 68 IN | WEIGHT: 167.19 LBS | RESPIRATION RATE: 16 BRPM | DIASTOLIC BLOOD PRESSURE: 68 MMHG | HEART RATE: 68 BPM | BODY MASS INDEX: 25.34 KG/M2 | SYSTOLIC BLOOD PRESSURE: 120 MMHG

## 2023-02-01 DIAGNOSIS — D50.8 OTHER IRON DEFICIENCY ANEMIA: Primary | ICD-10-CM

## 2023-02-01 DIAGNOSIS — I10 ESSENTIAL HYPERTENSION: ICD-10-CM

## 2023-02-01 DIAGNOSIS — E78.2 MIXED HYPERLIPIDEMIA: ICD-10-CM

## 2023-02-01 DIAGNOSIS — K29.81 DUODENITIS WITH BLEEDING: ICD-10-CM

## 2023-02-01 PROCEDURE — 3008F BODY MASS INDEX DOCD: CPT | Performed by: FAMILY MEDICINE

## 2023-02-01 PROCEDURE — 3074F SYST BP LT 130 MM HG: CPT | Performed by: FAMILY MEDICINE

## 2023-02-01 PROCEDURE — 99214 OFFICE O/P EST MOD 30 MIN: CPT | Performed by: FAMILY MEDICINE

## 2023-02-01 PROCEDURE — 3078F DIAST BP <80 MM HG: CPT | Performed by: FAMILY MEDICINE

## 2023-02-03 RX ORDER — ASCORBIC ACID 500 MG
TABLET ORAL
COMMUNITY
Start: 2023-02-01

## 2023-03-07 NOTE — TELEPHONE ENCOUNTER
Pt states had on episode of bright red blood on toilet paper after a BM- he was straining and does have Hx of hemorrhoids.  No blood seen in stool, recent colonoscopy 12/28/22 and was normal  Hx of anemia- last labs 11/2/22  Advised increasing fluids/fruits/vegtables, try not to stain, continue to watch for blood in stool, keep us updated on symptoms  Next appt 2/1/23 Bexarotene Pregnancy And Lactation Text: This medication is Pregnancy Category X and should not be given to women who are pregnant or may become pregnant. This medication should not be used if you are breast feeding.

## 2023-03-27 ENCOUNTER — LAB ENCOUNTER (OUTPATIENT)
Dept: LAB | Age: 63
End: 2023-03-27
Attending: FAMILY MEDICINE
Payer: COMMERCIAL

## 2023-03-27 DIAGNOSIS — D50.8 OTHER IRON DEFICIENCY ANEMIA: ICD-10-CM

## 2023-03-27 LAB
BASOPHILS # BLD AUTO: 0.03 X10(3) UL (ref 0–0.2)
BASOPHILS NFR BLD AUTO: 0.6 %
DEPRECATED HBV CORE AB SER IA-ACNC: 25.9 NG/ML
EOSINOPHIL # BLD AUTO: 0.04 X10(3) UL (ref 0–0.7)
EOSINOPHIL NFR BLD AUTO: 0.8 %
ERYTHROCYTE [DISTWIDTH] IN BLOOD BY AUTOMATED COUNT: 15.2 %
HCT VFR BLD AUTO: 40.7 %
HGB BLD-MCNC: 13.4 G/DL
IMM GRANULOCYTES # BLD AUTO: 0.01 X10(3) UL (ref 0–1)
IMM GRANULOCYTES NFR BLD: 0.2 %
IRON SATN MFR SERPL: 21 %
IRON SERPL-MCNC: 77 UG/DL
LYMPHOCYTES # BLD AUTO: 1.75 X10(3) UL (ref 1–4)
LYMPHOCYTES NFR BLD AUTO: 37.2 %
MCH RBC QN AUTO: 28.5 PG (ref 26–34)
MCHC RBC AUTO-ENTMCNC: 32.9 G/DL (ref 31–37)
MCV RBC AUTO: 86.6 FL
MONOCYTES # BLD AUTO: 0.64 X10(3) UL (ref 0.1–1)
MONOCYTES NFR BLD AUTO: 13.6 %
NEUTROPHILS # BLD AUTO: 2.24 X10 (3) UL (ref 1.5–7.7)
NEUTROPHILS # BLD AUTO: 2.24 X10(3) UL (ref 1.5–7.7)
NEUTROPHILS NFR BLD AUTO: 47.6 %
PLATELET # BLD AUTO: 229 10(3)UL (ref 150–450)
RBC # BLD AUTO: 4.7 X10(6)UL
TIBC SERPL-MCNC: 364 UG/DL (ref 240–450)
TRANSFERRIN SERPL-MCNC: 244 MG/DL (ref 200–360)
WBC # BLD AUTO: 4.7 X10(3) UL (ref 4–11)

## 2023-03-27 PROCEDURE — 83540 ASSAY OF IRON: CPT

## 2023-03-27 PROCEDURE — 85025 COMPLETE CBC W/AUTO DIFF WBC: CPT

## 2023-03-27 PROCEDURE — 36415 COLL VENOUS BLD VENIPUNCTURE: CPT

## 2023-03-27 PROCEDURE — 83550 IRON BINDING TEST: CPT

## 2023-03-27 PROCEDURE — 82728 ASSAY OF FERRITIN: CPT

## 2023-04-14 ENCOUNTER — LAB ENCOUNTER (OUTPATIENT)
Dept: LAB | Age: 63
End: 2023-04-14
Attending: FAMILY MEDICINE
Payer: COMMERCIAL

## 2023-04-14 DIAGNOSIS — K29.80 DUODENITIS: ICD-10-CM

## 2023-04-14 DIAGNOSIS — K29.60 OTHER SPECIFIED GASTRITIS, PRESENCE OF BLEEDING UNSPECIFIED, UNSPECIFIED CHRONICITY: ICD-10-CM

## 2023-04-14 LAB — IGA SERPL-MCNC: 275 MG/DL (ref 70–312)

## 2023-04-14 PROCEDURE — 82784 ASSAY IGA/IGD/IGG/IGM EACH: CPT

## 2023-04-14 PROCEDURE — 86364 TISS TRNSGLTMNASE EA IG CLAS: CPT

## 2023-04-14 PROCEDURE — 36415 COLL VENOUS BLD VENIPUNCTURE: CPT

## 2023-04-14 PROCEDURE — 81377 HLA II TYPE 1 AG EQUIV LR: CPT

## 2023-04-17 LAB — TTG IGA SER-ACNC: 23 U/ML (ref ?–7)

## 2023-04-26 ENCOUNTER — TELEPHONE (OUTPATIENT)
Dept: HEMATOLOGY/ONCOLOGY | Facility: HOSPITAL | Age: 63
End: 2023-04-26

## 2023-04-26 LAB
DQ2 (DQA1 0501/0505,DQB1 02XX): POSITIVE
DQ8 (DQA1 03XX, DQB1 0302): NEGATIVE

## 2023-04-28 ENCOUNTER — TELEPHONE (OUTPATIENT)
Dept: HEMATOLOGY/ONCOLOGY | Facility: HOSPITAL | Age: 63
End: 2023-04-28

## 2023-05-02 ENCOUNTER — OFFICE VISIT (OUTPATIENT)
Dept: HEMATOLOGY/ONCOLOGY | Facility: HOSPITAL | Age: 63
End: 2023-05-02
Payer: COMMERCIAL

## 2023-05-02 PROCEDURE — 97802 MEDICAL NUTRITION INDIV IN: CPT

## 2023-05-02 NOTE — PROGRESS NOTES
NUTRITION CONSULTATION for Gluten Free (GF)    DX: CELIAC DISEASE    Start time: 1500  End time: 1615     Assessment: There were no vitals taken for this visit. Services provided: Verbal/written reviewing CELIAC DZ; discussed basic nutrition concepts for GF diet    Comments/Recommendations: RD met w/ this pleasant, talkative, 62 y/o male for ix re: celiac dz/GF diet. Pt noted only sx of anemia. RD reviewed:   1. Pathophysiology of celiac dz; importance of strict adherence to diet; potential health consequences of diet non-compliance  2. Recommended f/u antigen testing and/or pending GI MD guidance  3. GF diet - foods to choose/question/avoid/cross-contamination risk  4. Grocery shopping/Dining out  5. References/Websites    Pt actively participated throughout consultation verbalizing understanding of recommendations made. RD provided name and # for referral.     Thank you for allowing me to participate in the care of Chelsey Wilkes. Thanh Dumont RD, [email or web address]  [phone]

## 2023-05-03 RX ORDER — OMEPRAZOLE 40 MG/1
40 CAPSULE, DELAYED RELEASE ORAL DAILY
COMMUNITY
Start: 2023-03-21

## 2023-09-26 ENCOUNTER — PATIENT MESSAGE (OUTPATIENT)
Dept: FAMILY MEDICINE CLINIC | Facility: CLINIC | Age: 63
End: 2023-09-26

## 2023-09-27 DIAGNOSIS — I10 ESSENTIAL HYPERTENSION: ICD-10-CM

## 2023-09-27 DIAGNOSIS — E78.2 MIXED HYPERLIPIDEMIA: ICD-10-CM

## 2023-09-27 RX ORDER — AMLODIPINE BESYLATE 5 MG/1
TABLET ORAL
Qty: 90 TABLET | Refills: 0 | Status: SHIPPED | OUTPATIENT
Start: 2023-09-27

## 2023-09-27 RX ORDER — ATORVASTATIN CALCIUM 20 MG/1
20 TABLET, FILM COATED ORAL NIGHTLY
Qty: 90 TABLET | Refills: 0 | Status: SHIPPED | OUTPATIENT
Start: 2023-09-27

## 2023-09-27 NOTE — TELEPHONE ENCOUNTER
From: Ju Quesada  To: Emmy Caller  Sent: 9/26/2023 6:20 PM CDT  Subject: Update my vaccine history    Hi - I just received my flu vaccine and Pfizer [id number] Covid-19 vaccine today. Can you please update my vaccine history to include these two? Thanks!

## 2023-09-27 NOTE — TELEPHONE ENCOUNTER
Requested Prescriptions     Pending Prescriptions Disp Refills    ATORVASTATIN 20 MG Oral Tab [Pharmacy Med Name: ATORVASTATIN 20 MG TABLET] 90 tablet 3     Sig: TAKE 1 TABLET BY MOUTH EVERY DAY AT NIGHT    AMLODIPINE 5 MG Oral Tab [Pharmacy Med Name: AMLODIPINE BESYLATE 5 MG TAB] 90 tablet 1     Sig: TAKE 1 TABLET BY MOUTH EVERY DAY     LOV 2/1/2023     Patient was asked to follow-up in: Follow-up not documented in note    Appointment scheduled: 10/20/2023 Meliton Ramirez MD     Medication refilled per protocol.

## 2023-10-09 ENCOUNTER — TELEPHONE (OUTPATIENT)
Dept: FAMILY MEDICINE CLINIC | Facility: CLINIC | Age: 63
End: 2023-10-09

## 2023-10-09 DIAGNOSIS — E61.1 IRON DEFICIENCY: ICD-10-CM

## 2023-10-09 DIAGNOSIS — Z13.29 SCREENING FOR THYROID DISORDER: ICD-10-CM

## 2023-10-09 DIAGNOSIS — E78.5 DYSLIPIDEMIA: ICD-10-CM

## 2023-10-09 DIAGNOSIS — Z00.00 LABORATORY EXAMINATION ORDERED AS PART OF A ROUTINE GENERAL MEDICAL EXAMINATION: ICD-10-CM

## 2023-10-09 DIAGNOSIS — Z12.5 SCREENING FOR PROSTATE CANCER: ICD-10-CM

## 2023-10-09 DIAGNOSIS — I10 ESSENTIAL HYPERTENSION: ICD-10-CM

## 2023-10-09 DIAGNOSIS — Z13.1 SCREENING FOR DIABETES MELLITUS: Primary | ICD-10-CM

## 2023-10-09 DIAGNOSIS — Z13.0 SCREENING FOR IRON DEFICIENCY ANEMIA: ICD-10-CM

## 2023-10-09 DIAGNOSIS — Z13.6 SCREENING FOR CARDIOVASCULAR CONDITION: ICD-10-CM

## 2023-10-09 NOTE — TELEPHONE ENCOUNTER
Please enter lab orders for the patient's upcoming physical appointment. Physical scheduled: Your appointments       Date & Time Appointment Department [facility])    Oct 20, 2023  9:15 AM CDT Physical - Established with Sherry Silva MD [address], [address], [place] ([address])              [address], [address], [place] [address] [place] [address] [phone]           Preferred lab: [address] [facility])     The patient has been notified to complete fasting labs prior to their physical appointment.

## 2023-10-09 NOTE — TELEPHONE ENCOUNTER
1. Screening for diabetes mellitus (Primary)  -     Comp Metabolic Panel (14)  2. Screening for iron deficiency anemia  -     CBC With Differential With Platelet  3. Screening for thyroid disorder  -     TSH W Reflex To Free T4  4. Screening for cardiovascular condition  -     Lipid Panel  5. Screening for prostate cancer  -     PSA Total, Screen; Future; Expected date: 10/09/2023  6. Laboratory examination ordered as part of a routine general medical examination  -     TSH W Reflex To Free T4  -     Lipid Panel  -     CBC With Differential With Platelet  -     Comp Metabolic Panel (14)  -     Ferritin  -     Iron And Tibc  7. Dyslipidemia  -     TSH W Reflex To Free T4  -     Lipid Panel  8. Essential hypertension  Overview:  Valsartan 160, amlodipine 5  Orders:  -     TSH W Reflex To Free T4  -     CBC With Differential With Platelet  -     Comp Metabolic Panel (14)  9. Iron deficiency  -     CBC With Differential With Platelet  -     Ferritin  -     Iron And Tibc       OK to notify.  Thanks, Jazmin Sandoval MD

## 2023-10-13 ENCOUNTER — LAB ENCOUNTER (OUTPATIENT)
Dept: LAB | Age: 63
End: 2023-10-13
Attending: FAMILY MEDICINE
Payer: COMMERCIAL

## 2023-10-13 DIAGNOSIS — Z12.5 SCREENING FOR PROSTATE CANCER: ICD-10-CM

## 2023-10-13 LAB
ALBUMIN SERPL-MCNC: 3.8 G/DL (ref 3.4–5)
ALBUMIN/GLOB SERPL: 1.3 {RATIO} (ref 1–2)
ALP LIVER SERPL-CCNC: 82 U/L
ALT SERPL-CCNC: 34 U/L
ANION GAP SERPL CALC-SCNC: 7 MMOL/L (ref 0–18)
AST SERPL-CCNC: 14 U/L (ref 15–37)
BASOPHILS # BLD AUTO: 0.02 X10(3) UL (ref 0–0.2)
BASOPHILS NFR BLD AUTO: 0.4 %
BILIRUB SERPL-MCNC: 2.2 MG/DL (ref 0.1–2)
BUN BLD-MCNC: 13 MG/DL (ref 7–18)
CALCIUM BLD-MCNC: 9 MG/DL (ref 8.5–10.1)
CHLORIDE SERPL-SCNC: 109 MMOL/L (ref 98–112)
CHOLEST SERPL-MCNC: 85 MG/DL (ref ?–200)
CO2 SERPL-SCNC: 24 MMOL/L (ref 21–32)
COMPLEXED PSA SERPL-MCNC: 0.42 NG/ML (ref ?–4)
CREAT BLD-MCNC: 0.97 MG/DL
DEPRECATED HBV CORE AB SER IA-ACNC: 150 NG/ML
EGFRCR SERPLBLD CKD-EPI 2021: 89 ML/MIN/1.73M2 (ref 60–?)
EOSINOPHIL # BLD AUTO: 0.04 X10(3) UL (ref 0–0.7)
EOSINOPHIL NFR BLD AUTO: 0.8 %
ERYTHROCYTE [DISTWIDTH] IN BLOOD BY AUTOMATED COUNT: 12.3 %
FASTING PATIENT LIPID ANSWER: YES
FASTING STATUS PATIENT QL REPORTED: YES
GLOBULIN PLAS-MCNC: 2.9 G/DL (ref 2.8–4.4)
GLUCOSE BLD-MCNC: 93 MG/DL (ref 70–99)
HCT VFR BLD AUTO: 39.5 %
HDLC SERPL-MCNC: 44 MG/DL (ref 40–59)
HGB BLD-MCNC: 13.7 G/DL
IMM GRANULOCYTES # BLD AUTO: 0.01 X10(3) UL (ref 0–1)
IMM GRANULOCYTES NFR BLD: 0.2 %
IRON SATN MFR SERPL: 61 %
IRON SERPL-MCNC: 178 UG/DL
LDLC SERPL CALC-MCNC: 26 MG/DL (ref ?–100)
LYMPHOCYTES # BLD AUTO: 2.16 X10(3) UL (ref 1–4)
LYMPHOCYTES NFR BLD AUTO: 43.7 %
MCH RBC QN AUTO: 31.4 PG (ref 26–34)
MCHC RBC AUTO-ENTMCNC: 34.7 G/DL (ref 31–37)
MCV RBC AUTO: 90.6 FL
MONOCYTES # BLD AUTO: 0.57 X10(3) UL (ref 0.1–1)
MONOCYTES NFR BLD AUTO: 11.5 %
NEUTROPHILS # BLD AUTO: 2.14 X10 (3) UL (ref 1.5–7.7)
NEUTROPHILS # BLD AUTO: 2.14 X10(3) UL (ref 1.5–7.7)
NEUTROPHILS NFR BLD AUTO: 43.4 %
NONHDLC SERPL-MCNC: 41 MG/DL (ref ?–130)
OSMOLALITY SERPL CALC.SUM OF ELEC: 290 MOSM/KG (ref 275–295)
PLATELET # BLD AUTO: 225 10(3)UL (ref 150–450)
POTASSIUM SERPL-SCNC: 3.6 MMOL/L (ref 3.5–5.1)
PROT SERPL-MCNC: 6.7 G/DL (ref 6.4–8.2)
RBC # BLD AUTO: 4.36 X10(6)UL
SODIUM SERPL-SCNC: 140 MMOL/L (ref 136–145)
TIBC SERPL-MCNC: 291 UG/DL (ref 240–450)
TRANSFERRIN SERPL-MCNC: 195 MG/DL (ref 200–360)
TRIGL SERPL-MCNC: 70 MG/DL (ref 30–149)
TSI SER-ACNC: 1.69 MIU/ML (ref 0.36–3.74)
VLDLC SERPL CALC-MCNC: 9 MG/DL (ref 0–30)
WBC # BLD AUTO: 4.9 X10(3) UL (ref 4–11)

## 2023-10-13 PROCEDURE — 82728 ASSAY OF FERRITIN: CPT | Performed by: FAMILY MEDICINE

## 2023-10-13 PROCEDURE — 36415 COLL VENOUS BLD VENIPUNCTURE: CPT | Performed by: FAMILY MEDICINE

## 2023-10-13 PROCEDURE — 80061 LIPID PANEL: CPT | Performed by: FAMILY MEDICINE

## 2023-10-13 PROCEDURE — 83540 ASSAY OF IRON: CPT | Performed by: FAMILY MEDICINE

## 2023-10-13 PROCEDURE — 83550 IRON BINDING TEST: CPT | Performed by: FAMILY MEDICINE

## 2023-10-13 PROCEDURE — 85025 COMPLETE CBC W/AUTO DIFF WBC: CPT | Performed by: FAMILY MEDICINE

## 2023-10-13 PROCEDURE — 80053 COMPREHEN METABOLIC PANEL: CPT | Performed by: FAMILY MEDICINE

## 2023-10-13 PROCEDURE — 84443 ASSAY THYROID STIM HORMONE: CPT | Performed by: FAMILY MEDICINE

## 2023-10-19 NOTE — ASSESSMENT & PLAN NOTE
BP shows good control with last BP of 120/68. Continue lifestyle changes, diet, exercise and weight loss. Last K was 3.6 done on 10/13/2023. Last Cr was 0.97 done on 10/13/2023. Hypertension meds include VALSARTAN 160 MG Oral Tab [[id number]].

## 2023-10-19 NOTE — ASSESSMENT & PLAN NOTE
Cholesterol shows Good control. Long term heart-healthy diet and lifestyle discussed and encouraged to reduce risk of cardiovascular disease. Cholesterol: 85, done on 10/13/2023. HDL Cholesterol: 44, done on 10/13/2023. TriGlycerides 70, done on 10/13/2023. LDL Cholesterol: 26, done on 10/13/2023. Cholesterol medications include atorvastatin 20 MG Oral Tab [[id number]].

## 2023-10-20 ENCOUNTER — OFFICE VISIT (OUTPATIENT)
Dept: FAMILY MEDICINE CLINIC | Facility: CLINIC | Age: 63
End: 2023-10-20
Payer: COMMERCIAL

## 2023-10-20 VITALS
BODY MASS INDEX: 25.28 KG/M2 | DIASTOLIC BLOOD PRESSURE: 86 MMHG | HEIGHT: 68 IN | RESPIRATION RATE: 16 BRPM | WEIGHT: 166.81 LBS | SYSTOLIC BLOOD PRESSURE: 136 MMHG | HEART RATE: 76 BPM

## 2023-10-20 DIAGNOSIS — K90.0 CELIAC DISEASE/SPRUE: ICD-10-CM

## 2023-10-20 DIAGNOSIS — D50.8 OTHER IRON DEFICIENCY ANEMIA: ICD-10-CM

## 2023-10-20 DIAGNOSIS — Z00.00 ANNUAL PHYSICAL EXAM: Primary | ICD-10-CM

## 2023-10-20 DIAGNOSIS — E78.2 MIXED HYPERLIPIDEMIA: ICD-10-CM

## 2023-10-20 DIAGNOSIS — I10 ESSENTIAL HYPERTENSION: ICD-10-CM

## 2023-10-20 PROCEDURE — 99396 PREV VISIT EST AGE 40-64: CPT | Performed by: FAMILY MEDICINE

## 2023-10-20 PROCEDURE — 3079F DIAST BP 80-89 MM HG: CPT | Performed by: FAMILY MEDICINE

## 2023-10-20 PROCEDURE — 3075F SYST BP GE 130 - 139MM HG: CPT | Performed by: FAMILY MEDICINE

## 2023-10-20 PROCEDURE — 3008F BODY MASS INDEX DOCD: CPT | Performed by: FAMILY MEDICINE

## 2023-10-20 RX ORDER — VALSARTAN 160 MG/1
160 TABLET ORAL DAILY
Qty: 90 TABLET | Refills: 3 | Status: SHIPPED | OUTPATIENT
Start: 2023-10-20

## 2023-10-20 RX ORDER — ATORVASTATIN CALCIUM 10 MG/1
10 TABLET, FILM COATED ORAL NIGHTLY
Qty: 90 TABLET | Refills: 3 | Status: SHIPPED | OUTPATIENT
Start: 2023-10-20

## 2024-01-01 DIAGNOSIS — I10 ESSENTIAL HYPERTENSION: ICD-10-CM

## 2024-01-04 RX ORDER — AMLODIPINE BESYLATE 5 MG/1
TABLET ORAL
Qty: 90 TABLET | Refills: 0 | Status: SHIPPED | OUTPATIENT
Start: 2024-01-04

## 2024-01-04 NOTE — TELEPHONE ENCOUNTER
Requested Prescriptions     Pending Prescriptions Disp Refills    AMLODIPINE 5 MG Oral Tab [Pharmacy Med Name: AMLODIPINE BESYLATE 5 MG TAB] 90 tablet 0     Sig: TAKE 1 TABLET BY MOUTH EVERY DAY     LOV 10/20/2023     Patient was asked to follow-up in: 1 year    Appointment scheduled: 10/25/2024 Zen Neil MD     Medication refilled per protocol.

## 2024-04-06 DIAGNOSIS — I10 ESSENTIAL HYPERTENSION: ICD-10-CM

## 2024-04-09 RX ORDER — AMLODIPINE BESYLATE 5 MG/1
TABLET ORAL
Qty: 90 TABLET | Refills: 0 | Status: SHIPPED | OUTPATIENT
Start: 2024-04-09

## 2024-07-04 DIAGNOSIS — I10 ESSENTIAL HYPERTENSION: ICD-10-CM

## 2024-07-05 RX ORDER — AMLODIPINE BESYLATE 5 MG/1
TABLET ORAL
Qty: 90 TABLET | Refills: 1 | Status: SHIPPED | OUTPATIENT
Start: 2024-07-05

## 2024-07-05 NOTE — TELEPHONE ENCOUNTER
Requested Prescriptions     Pending Prescriptions Disp Refills    AMLODIPINE 5 MG Oral Tab [Pharmacy Med Name: AMLODIPINE BESYLATE 5 MG TAB] 90 tablet 0     Sig: TAKE 1 TABLET BY MOUTH EVERY DAY     LOV 10/20/2023     Patient was asked to follow-up in: 1 year    Appointment scheduled: 10/25/2024 Zen Neil MD     Medication refilled per protocol.        Hypertension Medications Protocol [id number]/04/2024 12:18 AM   Protocol Details CMP or BMP in past 12 months    Last BP reading less than 140/90    In person appointment or virtual visit in the past 12 mos or appointment in next 3 mos    EGFRCR or GFRNAA > 50

## 2024-09-30 ENCOUNTER — TELEPHONE (OUTPATIENT)
Dept: FAMILY MEDICINE CLINIC | Facility: CLINIC | Age: 64
End: 2024-09-30

## 2024-09-30 DIAGNOSIS — Z13.29 SCREENING FOR THYROID DISORDER: ICD-10-CM

## 2024-09-30 DIAGNOSIS — Z13.1 SCREENING FOR DIABETES MELLITUS: Primary | ICD-10-CM

## 2024-09-30 DIAGNOSIS — Z13.6 SCREENING FOR CARDIOVASCULAR CONDITION: ICD-10-CM

## 2024-09-30 DIAGNOSIS — Z13.0 SCREENING FOR IRON DEFICIENCY ANEMIA: ICD-10-CM

## 2024-09-30 DIAGNOSIS — Z00.00 LABORATORY EXAMINATION ORDERED AS PART OF A ROUTINE GENERAL MEDICAL EXAMINATION: ICD-10-CM

## 2024-09-30 DIAGNOSIS — E61.1 IRON DEFICIENCY: ICD-10-CM

## 2024-09-30 NOTE — TELEPHONE ENCOUNTER
1. Screening for diabetes mellitus (Primary)  -     Comp Metabolic Panel (14)  2. Screening for iron deficiency anemia  -     CBC With Differential With Platelet  3. Screening for thyroid disorder  -     TSH W Reflex To Free T4  4. Screening for cardiovascular condition  -     Lipid Panel  5. Laboratory examination ordered as part of a routine general medical examination  -     TSH W Reflex To Free T4  -     Lipid Panel  -     CBC With Differential With Platelet  -     Comp Metabolic Panel (14)  -     Ferritin  -     Iron And Tibc  6. Iron deficiency  -     CBC With Differential With Platelet  -     Ferritin  -     Iron And Tibc       OK to notify. Thanks, Scott Neil MD

## 2024-09-30 NOTE — TELEPHONE ENCOUNTER
Please enter lab orders for the patient's upcoming physical appointment.     Physical scheduled:   Your appointments       Date & Time Appointment Department ([place])    Oct 28, 2024 4:30 PM CDT Physical - Established with Zen Neil MD [facility])              Critical access hospital  [address]  [facility] [address]  [phone]           Preferred lab: [facility] ([facility])     The patient has been notified to complete fasting labs prior to their physical appointment.

## 2024-10-03 ENCOUNTER — PATIENT MESSAGE (OUTPATIENT)
Dept: FAMILY MEDICINE CLINIC | Facility: CLINIC | Age: 64
End: 2024-10-03

## 2024-10-04 NOTE — TELEPHONE ENCOUNTER
From: Scout Quesada  To: Zen Neil  Sent: 10/3/2024 9:42 PM CDT  Subject: Update vaccine records    Hi - I received my flu and COVID-19 vaccines today. Could you please update my records? Thanks!

## 2024-10-25 ENCOUNTER — LAB ENCOUNTER (OUTPATIENT)
Dept: LAB | Age: 64
End: 2024-10-25
Attending: FAMILY MEDICINE
Payer: COMMERCIAL

## 2024-10-25 LAB
ALBUMIN SERPL-MCNC: 4.2 G/DL (ref 3.2–4.8)
ALBUMIN/GLOB SERPL: 1.9 {RATIO} (ref 1–2)
ALP LIVER SERPL-CCNC: 77 U/L
ALT SERPL-CCNC: 23 U/L
ANION GAP SERPL CALC-SCNC: 7 MMOL/L (ref 0–18)
AST SERPL-CCNC: 20 U/L (ref ?–34)
BASOPHILS # BLD AUTO: 0.02 X10(3) UL (ref 0–0.2)
BASOPHILS NFR BLD AUTO: 0.4 %
BILIRUB SERPL-MCNC: 1.6 MG/DL (ref 0.2–1.1)
BUN BLD-MCNC: 13 MG/DL (ref 9–23)
CALCIUM BLD-MCNC: 9.5 MG/DL (ref 8.7–10.4)
CHLORIDE SERPL-SCNC: 109 MMOL/L (ref 98–112)
CHOLEST SERPL-MCNC: 111 MG/DL (ref ?–200)
CO2 SERPL-SCNC: 22 MMOL/L (ref 21–32)
CREAT BLD-MCNC: 0.93 MG/DL
DEPRECATED HBV CORE AB SER IA-ACNC: 122 NG/ML
EGFRCR SERPLBLD CKD-EPI 2021: 93 ML/MIN/1.73M2 (ref 60–?)
EOSINOPHIL # BLD AUTO: 0.05 X10(3) UL (ref 0–0.7)
EOSINOPHIL NFR BLD AUTO: 1 %
ERYTHROCYTE [DISTWIDTH] IN BLOOD BY AUTOMATED COUNT: 12.7 %
FASTING PATIENT LIPID ANSWER: YES
FASTING STATUS PATIENT QL REPORTED: YES
GLOBULIN PLAS-MCNC: 2.2 G/DL (ref 2–3.5)
GLUCOSE BLD-MCNC: 88 MG/DL (ref 70–99)
HCT VFR BLD AUTO: 37.7 %
HDLC SERPL-MCNC: 43 MG/DL (ref 40–59)
HGB BLD-MCNC: 13 G/DL
IMM GRANULOCYTES # BLD AUTO: 0.01 X10(3) UL (ref 0–1)
IMM GRANULOCYTES NFR BLD: 0.2 %
IRON SATN MFR SERPL: 35 %
IRON SERPL-MCNC: 90 UG/DL
LDLC SERPL CALC-MCNC: 52 MG/DL (ref ?–100)
LYMPHOCYTES # BLD AUTO: 1.96 X10(3) UL (ref 1–4)
LYMPHOCYTES NFR BLD AUTO: 38 %
MCH RBC QN AUTO: 31.3 PG (ref 26–34)
MCHC RBC AUTO-ENTMCNC: 34.5 G/DL (ref 31–37)
MCV RBC AUTO: 90.8 FL
MONOCYTES # BLD AUTO: 0.58 X10(3) UL (ref 0.1–1)
MONOCYTES NFR BLD AUTO: 11.2 %
NEUTROPHILS # BLD AUTO: 2.54 X10 (3) UL (ref 1.5–7.7)
NEUTROPHILS # BLD AUTO: 2.54 X10(3) UL (ref 1.5–7.7)
NEUTROPHILS NFR BLD AUTO: 49.2 %
NONHDLC SERPL-MCNC: 68 MG/DL (ref ?–130)
OSMOLALITY SERPL CALC.SUM OF ELEC: 286 MOSM/KG (ref 275–295)
PLATELET # BLD AUTO: 211 10(3)UL (ref 150–450)
POTASSIUM SERPL-SCNC: 3.5 MMOL/L (ref 3.5–5.1)
PROT SERPL-MCNC: 6.4 G/DL (ref 5.7–8.2)
RBC # BLD AUTO: 4.15 X10(6)UL
SODIUM SERPL-SCNC: 138 MMOL/L (ref 136–145)
TOTAL IRON BINDING CAPACITY: 258 UG/DL (ref 250–425)
TRANSFERRIN SERPL-MCNC: 205 MG/DL (ref 215–365)
TRIGL SERPL-MCNC: 77 MG/DL (ref 30–149)
TSI SER-ACNC: 3.83 MIU/ML (ref 0.55–4.78)
VLDLC SERPL CALC-MCNC: 11 MG/DL (ref 0–30)
WBC # BLD AUTO: 5.2 X10(3) UL (ref 4–11)

## 2024-10-25 PROCEDURE — 85025 COMPLETE CBC W/AUTO DIFF WBC: CPT | Performed by: FAMILY MEDICINE

## 2024-10-25 PROCEDURE — 83550 IRON BINDING TEST: CPT | Performed by: FAMILY MEDICINE

## 2024-10-25 PROCEDURE — 84443 ASSAY THYROID STIM HORMONE: CPT | Performed by: FAMILY MEDICINE

## 2024-10-25 PROCEDURE — 80061 LIPID PANEL: CPT | Performed by: FAMILY MEDICINE

## 2024-10-25 PROCEDURE — 83540 ASSAY OF IRON: CPT | Performed by: FAMILY MEDICINE

## 2024-10-25 PROCEDURE — 82728 ASSAY OF FERRITIN: CPT | Performed by: FAMILY MEDICINE

## 2024-10-25 PROCEDURE — 36415 COLL VENOUS BLD VENIPUNCTURE: CPT | Performed by: FAMILY MEDICINE

## 2024-10-25 PROCEDURE — 80053 COMPREHEN METABOLIC PANEL: CPT | Performed by: FAMILY MEDICINE

## 2024-10-27 NOTE — ASSESSMENT & PLAN NOTE
10/25/2024: WBC 5.2; HGB 13.0; .0; MCV 90.8 stable, continue present management and continue to monitor for progression

## 2024-10-27 NOTE — ASSESSMENT & PLAN NOTE
BP shows borderline control with last BP of 136/86. Work on lifestyle changes, diet, exercise and weight management.   10/25/2024: Potassium 3.5; Creatinine 0.93; eGFR-Cr 93  BP Meds: amLODIPine Tabs - 5 MG; valsartan Tabs - 160 MG   ACE/ARB Adherence Good at 97%

## 2024-10-27 NOTE — ASSESSMENT & PLAN NOTE
Cholesterol shows Good control. Long term heart-healthy diet and lifestyle discussed and encouraged to reduce risk of cardiovascular disease.  10/25/2024: Cholesterol, Total 111; HDL Cholesterol 43; Triglycerides 77; LDL Cholesterol 52  Cholesterol Meds: atorvastatin Tabs - 10 MG  stable  Continue with current treatment plan

## 2024-10-28 ENCOUNTER — OFFICE VISIT (OUTPATIENT)
Dept: FAMILY MEDICINE CLINIC | Facility: CLINIC | Age: 64
End: 2024-10-28
Payer: COMMERCIAL

## 2024-10-28 VITALS
SYSTOLIC BLOOD PRESSURE: 134 MMHG | HEART RATE: 74 BPM | HEIGHT: 68 IN | WEIGHT: 162.38 LBS | BODY MASS INDEX: 24.61 KG/M2 | DIASTOLIC BLOOD PRESSURE: 72 MMHG | RESPIRATION RATE: 12 BRPM

## 2024-10-28 DIAGNOSIS — I10 ESSENTIAL HYPERTENSION: ICD-10-CM

## 2024-10-28 DIAGNOSIS — K90.0: ICD-10-CM

## 2024-10-28 DIAGNOSIS — E78.2 MIXED HYPERLIPIDEMIA: ICD-10-CM

## 2024-10-28 DIAGNOSIS — Z00.00 ANNUAL PHYSICAL EXAM: Primary | ICD-10-CM

## 2024-10-28 DIAGNOSIS — D50.8 OTHER IRON DEFICIENCY ANEMIA: ICD-10-CM

## 2024-10-28 PROCEDURE — 90471 IMMUNIZATION ADMIN: CPT | Performed by: FAMILY MEDICINE

## 2024-10-28 PROCEDURE — 99396 PREV VISIT EST AGE 40-64: CPT | Performed by: FAMILY MEDICINE

## 2024-10-28 PROCEDURE — 90715 TDAP VACCINE 7 YRS/> IM: CPT | Performed by: FAMILY MEDICINE

## 2024-10-28 RX ORDER — AMLODIPINE BESYLATE 5 MG/1
5 TABLET ORAL DAILY
Qty: 90 TABLET | Refills: 3 | Status: SHIPPED | OUTPATIENT
Start: 2024-10-28

## 2024-10-28 RX ORDER — ATORVASTATIN CALCIUM 10 MG/1
10 TABLET, FILM COATED ORAL NIGHTLY
Qty: 90 TABLET | Refills: 3 | Status: SHIPPED | OUTPATIENT
Start: 2024-10-28

## 2024-10-28 RX ORDER — VALSARTAN 160 MG/1
160 TABLET ORAL DAILY
Qty: 90 TABLET | Refills: 3 | Status: SHIPPED | OUTPATIENT
Start: 2024-10-28

## 2024-10-28 NOTE — PROGRESS NOTES
Scout Quesada is a 63 year old male who presents for a complete physical exam.     had concerns including Physical (Annual ).   No topic due editable text      Subjective:    He complains of doing well, trouble falling asleep at times, PHQ 9 scor 2,.     Tobacco:  He has never smoked tobacco.     Wt Readings from Last 4 Encounters:   10/28/24 162 lb 6.4 oz (73.7 kg)   10/20/23 166 lb 12.8 oz (75.7 kg)   02/01/23 167 lb 3.2 oz (75.8 kg)   12/23/22 165 lb 6.4 oz (75 kg)     Body mass index is 24.69 kg/m².     The ASCVD Risk score (Charlotte MORTON, et al., 2019) failed to calculate for the following reasons:    The valid total cholesterol range is 130 to 320 mg/dL    Chief Complaint Reviewed and Verified  Nursing Notes Reviewed and   Verified  Tobacco Reviewed  Allergies Reviewed  Medications Reviewed    Problem List Reviewed  Medical History Reviewed  Surgical History   Reviewed  Family History Reviewed          His family history includes Alcohol and Other Disorders Associated in his father; Dementia in his maternal grandfather; Eye Problems in his maternal grandfather; Heart Attack in his father; Heart Disease in his father and mother; Osteoporosis in his maternal grandmother; Stroke in his maternal grandmother.   He  reports that he has never smoked. He has never used smokeless tobacco. He reports current alcohol use of about 1.0 - 3.0 standard drink of alcohol per week. He reports that he does not use drugs.    Exercise: three times per week.  Diet: watches fats closely and watches sugar closely    Health Maintenance   Topic Date Due    PSA  10/13/2025      No results found for this or any previous visit.     Health Maintenance   Topic Date Due    Colorectal Cancer Screening  12/28/2032      No recommendations at this time   Health Maintenance Due   Topic Date Due    Annual Depression Screening  01/01/2024    DTaP,Tdap,and Td Vaccines (2 - Td or Tdap) 07/30/2024    Annual Physical  10/20/2024          Review of Systems   Constitutional: Negative.  Negative for activity change, appetite change, chills and fever.   HENT: Negative.     Eyes: Negative.    Respiratory: Negative.  Negative for shortness of breath.    Cardiovascular: Negative.  Negative for chest pain and palpitations.   Gastrointestinal: Negative.  Negative for abdominal pain.   Genitourinary: Negative.  Negative for dysuria.   Musculoskeletal:  Negative for arthralgias.   Skin: Negative.  Negative for rash.   Allergic/Immunologic: Negative.    Neurological: Negative.         Results:    Lab Results   Component Value Date/Time    WBC 5.2 10/25/2024 08:11 AM    HGB 13.0 10/25/2024 08:11 AM    .0 10/25/2024 08:11 AM      Lab Results   Component Value Date/Time    GLU 88 10/25/2024 08:11 AM     10/25/2024 08:11 AM    K 3.5 10/25/2024 08:11 AM     10/25/2024 08:11 AM    CO2 22.0 10/25/2024 08:11 AM    CREATSERUM 0.93 10/25/2024 08:11 AM    CA 9.5 10/25/2024 08:11 AM    ALB 4.2 10/25/2024 08:11 AM    TP 6.4 10/25/2024 08:11 AM    ALKPHO 77 10/25/2024 08:11 AM    AST 20 10/25/2024 08:11 AM    ALT 23 10/25/2024 08:11 AM    BILT 1.6 (H) 10/25/2024 08:11 AM    TSH 3.831 10/25/2024 08:11 AM    T4F 1.0 07/21/2014 08:24 AM        Lab Results   Component Value Date/Time    CHOLEST 111 10/25/2024 08:11 AM    HDL 43 10/25/2024 08:11 AM    TRIG 77 10/25/2024 08:11 AM    LDL 52 10/25/2024 08:11 AM    NONHDLC 68 10/25/2024 08:11 AM       Last A1c value was  % done  .     Vitamin D:     No results found for: \"VITD\"       Objective:    EXAM:  /72   Pulse 74   Resp 12   Ht 5' 8\" (1.727 m)   Wt 162 lb 6.4 oz (73.7 kg)   BMI 24.69 kg/m²  Estimated body mass index is 24.69 kg/m² as calculated from the following:    Height as of this encounter: 5' 8\" (1.727 m).    Weight as of this encounter: 162 lb 6.4 oz (73.7 kg).   Physical Exam  Vitals and nursing note reviewed.   Constitutional:       General: He is not in acute distress.     Appearance:  Normal appearance.   HENT:      Head: Normocephalic and atraumatic.      Right Ear: Tympanic membrane and external ear normal.      Left Ear: Tympanic membrane and external ear normal.      Nose: Nose normal.      Mouth/Throat:      Mouth: Mucous membranes are moist.   Eyes:      Extraocular Movements: Extraocular movements intact.      Pupils: Pupils are equal, round, and reactive to light.   Cardiovascular:      Rate and Rhythm: Normal rate and regular rhythm.      Pulses: Normal pulses.           Carotid pulses are 2+ on the right side and 2+ on the left side.       Radial pulses are 2+ on the right side and 2+ on the left side.        Dorsalis pedis pulses are 2+ on the right side and 2+ on the left side.        Posterior tibial pulses are 2+ on the right side and 2+ on the left side.      Heart sounds: Normal heart sounds, S1 normal and S2 normal. No murmur heard.  Pulmonary:      Effort: Pulmonary effort is normal.      Breath sounds: Normal breath sounds.   Abdominal:      General: Abdomen is flat. Bowel sounds are normal. There is no distension.      Palpations: Abdomen is soft.   Musculoskeletal:         General: Normal range of motion.      Cervical back: Normal range of motion and neck supple.      Right lower leg: No edema.      Left lower leg: No edema.   Skin:     General: Skin is warm and dry.      Capillary Refill: Capillary refill takes less than 2 seconds.   Neurological:      General: No focal deficit present.      Mental Status: He is alert and oriented to person, place, and time.   Psychiatric:         Mood and Affect: Mood normal.         Behavior: Behavior normal.         Thought Content: Thought content normal.          Assessment & Plan:    Scout Quesada is a 63 year old male who presents for a complete physical exam.   Pt's weight is Body mass index is 24.69 kg/m²., recommended low fat diet and aerobic exercise 30 minutes three times weekly.   Health maintenance, Up to date     Immunizations: Up to date   Immunization History   Administered Date(s) Administered    >=3 YRS TRI  MULTIDOSE VIAL (85884) [facility]U [facility] 12/05/2012, 10/23/2014, 09/10/2015    Covid-19 Vaccine Pfizer 30 mcg/0.3 ml 04/16/2021, 05/07/2021, 11/08/2021    Covid-19 Vaccine Pfizer Bivalent 30mcg/0.3mL 09/19/2022    FLULAVAL 6 months & older 0.5 ml Prefilled syringe (46997) 09/20/2018, 09/12/2019    FLUZONE 6 months and older PFS 0.5 ml (74811) 09/21/2016, 09/14/2017, 09/12/2019, 08/31/2020, 09/28/2021, 09/26/2023    Flucelvax 0.5 Ml Quad PFS Single Dose 09/20/2018    Flucelvax 0.5ml Vaccine 09/20/2018, 09/19/2022    Flucelvax Influenza vaccine, trivalent (ccIIV3), 0.5mL IM 09/20/2018, 09/19/2022    Influenza 10/28/2013, 09/11/2015, 09/21/2016, 09/14/2017, 09/20/2018, 08/31/2020, 09/28/2021, 09/19/2022    Influenza Vaccine, trivalent (IIV3), PF 0.5mL ([id number]) 10/03/2024    Pfizer Covid-19 Vaccine 30mcg/0.3ml 12yrs+ 09/26/2023, 10/03/2024    TDAP 07/30/2014, 10/28/2024    Zoster Vaccine Recombinant Adjuvanted (Shingrix) 03/25/2019, 06/10/2019         Pt info given for: exercise, low fat diet, The patient indicates understanding of these issues and agrees to the plan.  The patient is asked to return for CPX in 1 years.    Assessment:  1. Annual physical exam (Primary)  2. Mixed hyperlipidemia  Overview:  Atorvastatin 20 started 10/14/2022 for ASCVD 10% risk  Assessment & Plan:  Cholesterol shows Good control. Long term heart-healthy diet and lifestyle discussed and encouraged to reduce risk of cardiovascular disease.  10/25/2024: Cholesterol, Total 111; HDL Cholesterol 43; Triglycerides 77; LDL Cholesterol 52  Cholesterol Meds: atorvastatin Tabs - 10 MG  stable  Continue with current treatment plan   Orders:  -     Atorvastatin Calcium; Take 1 tablet (10 mg total) by mouth nightly.  Dispense: 90 tablet; Refill: 3  3. Essential hypertension  Overview:  Valsartan 160, amlodipine 5  Assessment & Plan:  BP shows borderline control  with last BP of 136/86. Work on lifestyle changes, diet, exercise and weight management.   10/25/2024: Potassium 3.5; Creatinine 0.93; eGFR-Cr 93  BP Meds: amLODIPine Tabs - 5 MG; valsartan Tabs - 160 MG   ACE/ARB Adherence Good at 97%    Orders:  -     Valsartan; Take 1 tablet (160 mg total) by mouth daily.  Dispense: 90 tablet; Refill: 3  -     amLODIPine Besylate; Take 1 tablet (5 mg total) by mouth daily.  Dispense: 90 tablet; Refill: 3  4. Celiac disease/sprue (HCC)  Assessment & Plan:  Stable, continue present management and continue to monitor for progression   5. Other iron deficiency anemia  Assessment & Plan:  10/25/2024: WBC 5.2; HGB 13.0; .0; MCV 90.8 stable, continue present management and continue to monitor for progression   Other orders  -     TdaP (Boostrix) Vaccine (> 7 Y)     I have changed Scout Quesada's amLODIPine. I am also having him maintain his Vitamin D3, Cetirizine HCl (KLS ALLER-SARA OR), Multiple Vitamin (MULTI-VITAMIN DAILY OR), atorvastatin, and valsartan.     Return in about 1 year (around 10/28/2025) for Annual physical.

## 2024-10-28 NOTE — PATIENT INSTRUCTIONS
Treating Insomnia     Learning to relax before bedtime can improve your sleep.   Good sleeping habits are a key part of treatment. If needed, some medicines may help you sleep better at first. Making healthy lifestyle changes and learning to relax can improve your sleep. Treating insomnia takes commitment. But trust that your efforts will pay off. Be sure to talk with your healthcare provider before taking any medicine.  Healthy lifestyle  Your lifestyle affects your health and your sleep. Here are some healthy habits:  Keep a regular sleep schedule. Go to bed and get up at the same time each day.  Exercise regularly. It may help you reduce stress. Avoid strenuous exercise for 2 to 4 hours before bedtime.  Avoid or limit naps, especially in the late afternoon.  Use your bed only for sleep and sex.  Don’t spend too much time in bed trying to fall asleep. If you can’t fall asleep, get up and do something (no electronics) until you become tired and drowsy.  Avoid or limit caffeine and nicotine for up to 6 hours before bedtime. They can keep you awake at night.   Also avoid alcohol for at least 4 to 6 hours before bedtime. It may help you fall asleep at first. But you will have more awakenings during the night. And your sleep will not be restful.  Before bedtime  To sleep better every night, try these tips:  Have a bedtime routine to let your body and mind know when it’s time to sleep.  Think of going to bed as relaxing and enjoyable. Sleep will come sooner.  If your worries don’t let you sleep, write them down in a diary. Then close it, and go to bed.  Make sure the room is not too hot or too cold. If it’s not dark enough, an eye mask can help. If it’s noisy, try using earplugs.  Don't eat a large meal just before bedtime.  Remove noises, bright lights, TVs, cell phones, and computers from your sleeping environment.  Use a comfortable mattress and pillow.  Learn to relax  Stress, anxiety, and body tension may keep  you awake at night. To unwind before bedtime, try a warm bath, meditation, or yoga. Also try the following:  Deep breathing. Sit or lie back in a chair. Take a slow, deep breath. Hold it for 5 counts. Then breathe out slowly through your mouth. Keep doing this until you feel relaxed.  Progressive muscle relaxation. Tense and then relax the muscles in your body as you breathe deeply. Start with your feet and work up your body to your neck and face.  Date Last Reviewed: 8/1/2017  © 0642-1270 [employer]. [address] [place] [address]. All rights reserved. This information is not intended as a substitute for professional medical care. Always follow your healthcare professional's instructions.

## 2025-04-11 ENCOUNTER — PATIENT MESSAGE (OUTPATIENT)
Dept: FAMILY MEDICINE CLINIC | Facility: CLINIC | Age: 65
End: 2025-04-11